# Patient Record
Sex: FEMALE | Race: WHITE | NOT HISPANIC OR LATINO | Employment: OTHER | URBAN - METROPOLITAN AREA
[De-identification: names, ages, dates, MRNs, and addresses within clinical notes are randomized per-mention and may not be internally consistent; named-entity substitution may affect disease eponyms.]

---

## 2017-09-21 ENCOUNTER — APPOINTMENT (EMERGENCY)
Dept: CT IMAGING | Facility: HOSPITAL | Age: 32
End: 2017-09-21
Payer: COMMERCIAL

## 2017-09-21 ENCOUNTER — HOSPITAL ENCOUNTER (OUTPATIENT)
Facility: HOSPITAL | Age: 32
Setting detail: OBSERVATION
Discharge: HOME | End: 2017-09-22
Attending: EMERGENCY MEDICINE | Admitting: GENERAL PRACTICE
Payer: COMMERCIAL

## 2017-09-21 DIAGNOSIS — R20.2 PARESTHESIA OF LEFT ARM: Primary | ICD-10-CM

## 2017-09-21 DIAGNOSIS — R51.9 OCCIPITAL HEADACHE: ICD-10-CM

## 2017-09-21 PROBLEM — R20.0 HAND NUMBNESS: Status: ACTIVE | Noted: 2017-09-21

## 2017-09-21 LAB
ALBUMIN SERPL BCP-MCNC: 3.8 G/DL (ref 3.5–5)
ALP SERPL-CCNC: 63 U/L (ref 46–116)
ALT SERPL W P-5'-P-CCNC: 57 U/L (ref 12–78)
ANION GAP SERPL CALCULATED.3IONS-SCNC: 12 MMOL/L (ref 4–13)
AST SERPL W P-5'-P-CCNC: 26 U/L (ref 5–45)
BASOPHILS # BLD AUTO: 0.03 THOUSANDS/ΜL (ref 0–0.1)
BASOPHILS NFR BLD AUTO: 0 % (ref 0–1)
BILIRUB SERPL-MCNC: 0.5 MG/DL (ref 0.2–1)
BUN SERPL-MCNC: 19 MG/DL (ref 5–25)
CALCIUM SERPL-MCNC: 9.1 MG/DL (ref 8.3–10.1)
CHLORIDE SERPL-SCNC: 101 MMOL/L (ref 100–108)
CLARITY, POC: CLEAR
CO2 SERPL-SCNC: 23 MMOL/L (ref 21–32)
COLOR, POC: NORMAL
CREAT SERPL-MCNC: 0.98 MG/DL (ref 0.6–1.3)
EOSINOPHIL # BLD AUTO: 0.13 THOUSAND/ΜL (ref 0–0.61)
EOSINOPHIL NFR BLD AUTO: 1 % (ref 0–6)
ERYTHROCYTE [DISTWIDTH] IN BLOOD BY AUTOMATED COUNT: 13.7 % (ref 11.6–15.1)
EXT BILIRUBIN, UA: NORMAL
EXT BLOOD URINE: NORMAL
EXT GLUCOSE, UA: NORMAL
EXT KETONES: NORMAL
EXT NITRITE, UA: NORMAL
EXT PH, UA: 5
EXT PREG TEST URINE: NORMAL
EXT PROTEIN, UA: NORMAL
EXT SPECIFIC GRAVITY, UA: 1.01
EXT UROBILINOGEN: 0.2
GFR SERPL CREATININE-BSD FRML MDRD: 76 ML/MIN/1.73SQ M
GLUCOSE SERPL-MCNC: 103 MG/DL (ref 65–140)
HCT VFR BLD AUTO: 40.3 % (ref 34.8–46.1)
HGB BLD-MCNC: 13.6 G/DL (ref 11.5–15.4)
LYMPHOCYTES # BLD AUTO: 3.04 THOUSANDS/ΜL (ref 0.6–4.47)
LYMPHOCYTES NFR BLD AUTO: 28 % (ref 14–44)
MAGNESIUM SERPL-MCNC: 2.2 MG/DL (ref 1.6–2.6)
MCH RBC QN AUTO: 29.9 PG (ref 26.8–34.3)
MCHC RBC AUTO-ENTMCNC: 33.7 G/DL (ref 31.4–37.4)
MCV RBC AUTO: 89 FL (ref 82–98)
MONOCYTES # BLD AUTO: 0.75 THOUSAND/ΜL (ref 0.17–1.22)
MONOCYTES NFR BLD AUTO: 7 % (ref 4–12)
NEUTROPHILS # BLD AUTO: 6.79 THOUSANDS/ΜL (ref 1.85–7.62)
NEUTS SEG NFR BLD AUTO: 63 % (ref 43–75)
NRBC BLD AUTO-RTO: 0 /100 WBCS
PLATELET # BLD AUTO: 359 THOUSANDS/UL (ref 149–390)
PMV BLD AUTO: 9.7 FL (ref 8.9–12.7)
POTASSIUM SERPL-SCNC: 4.1 MMOL/L (ref 3.5–5.3)
PROT SERPL-MCNC: 8.4 G/DL (ref 6.4–8.2)
RBC # BLD AUTO: 4.55 MILLION/UL (ref 3.81–5.12)
SODIUM SERPL-SCNC: 136 MMOL/L (ref 136–145)
TROPONIN I SERPL-MCNC: <0.02 NG/ML
WBC # BLD AUTO: 10.78 THOUSAND/UL (ref 4.31–10.16)
WBC # BLD EST: NORMAL 10*3/UL

## 2017-09-21 PROCEDURE — 70496 CT ANGIOGRAPHY HEAD: CPT

## 2017-09-21 PROCEDURE — 80053 COMPREHEN METABOLIC PANEL: CPT | Performed by: EMERGENCY MEDICINE

## 2017-09-21 PROCEDURE — 93005 ELECTROCARDIOGRAM TRACING: CPT | Performed by: EMERGENCY MEDICINE

## 2017-09-21 PROCEDURE — 70498 CT ANGIOGRAPHY NECK: CPT

## 2017-09-21 PROCEDURE — 96361 HYDRATE IV INFUSION ADD-ON: CPT

## 2017-09-21 PROCEDURE — 84484 ASSAY OF TROPONIN QUANT: CPT | Performed by: EMERGENCY MEDICINE

## 2017-09-21 PROCEDURE — 81025 URINE PREGNANCY TEST: CPT | Performed by: EMERGENCY MEDICINE

## 2017-09-21 PROCEDURE — 36415 COLL VENOUS BLD VENIPUNCTURE: CPT | Performed by: EMERGENCY MEDICINE

## 2017-09-21 PROCEDURE — 96374 THER/PROPH/DIAG INJ IV PUSH: CPT

## 2017-09-21 PROCEDURE — 96375 TX/PRO/DX INJ NEW DRUG ADDON: CPT

## 2017-09-21 PROCEDURE — 85025 COMPLETE CBC W/AUTO DIFF WBC: CPT | Performed by: EMERGENCY MEDICINE

## 2017-09-21 PROCEDURE — 81002 URINALYSIS NONAUTO W/O SCOPE: CPT | Performed by: EMERGENCY MEDICINE

## 2017-09-21 PROCEDURE — 83735 ASSAY OF MAGNESIUM: CPT | Performed by: EMERGENCY MEDICINE

## 2017-09-21 RX ORDER — ASPIRIN 325 MG
325 TABLET ORAL DAILY
Status: DISCONTINUED | OUTPATIENT
Start: 2017-09-22 | End: 2017-09-22 | Stop reason: HOSPADM

## 2017-09-21 RX ORDER — ATORVASTATIN CALCIUM 40 MG/1
40 TABLET, FILM COATED ORAL EVERY EVENING
Status: DISCONTINUED | OUTPATIENT
Start: 2017-09-22 | End: 2017-09-22 | Stop reason: HOSPADM

## 2017-09-21 RX ORDER — METOCLOPRAMIDE HYDROCHLORIDE 5 MG/ML
10 INJECTION INTRAMUSCULAR; INTRAVENOUS ONCE
Status: COMPLETED | OUTPATIENT
Start: 2017-09-21 | End: 2017-09-21

## 2017-09-21 RX ORDER — SODIUM CHLORIDE 9 MG/ML
125 INJECTION, SOLUTION INTRAVENOUS CONTINUOUS
Status: DISCONTINUED | OUTPATIENT
Start: 2017-09-21 | End: 2017-09-22 | Stop reason: HOSPADM

## 2017-09-21 RX ORDER — BUTALBITAL, ACETAMINOPHEN AND CAFFEINE 50; 325; 40 MG/1; MG/1; MG/1
1 TABLET ORAL EVERY 4 HOURS PRN
Status: DISCONTINUED | OUTPATIENT
Start: 2017-09-21 | End: 2017-09-22 | Stop reason: HOSPADM

## 2017-09-21 RX ORDER — DIPHENHYDRAMINE HYDROCHLORIDE 50 MG/ML
25 INJECTION INTRAMUSCULAR; INTRAVENOUS ONCE
Status: COMPLETED | OUTPATIENT
Start: 2017-09-21 | End: 2017-09-21

## 2017-09-21 RX ADMIN — SODIUM CHLORIDE 1000 ML: 0.9 INJECTION, SOLUTION INTRAVENOUS at 19:41

## 2017-09-21 RX ADMIN — DIPHENHYDRAMINE HYDROCHLORIDE 25 MG: 50 INJECTION, SOLUTION INTRAMUSCULAR; INTRAVENOUS at 19:41

## 2017-09-21 RX ADMIN — METOCLOPRAMIDE 10 MG: 5 INJECTION, SOLUTION INTRAMUSCULAR; INTRAVENOUS at 19:38

## 2017-09-21 RX ADMIN — IOHEXOL 85 ML: 350 INJECTION, SOLUTION INTRAVENOUS at 20:20

## 2017-09-22 ENCOUNTER — APPOINTMENT (OUTPATIENT)
Dept: MRI IMAGING | Facility: HOSPITAL | Age: 32
End: 2017-09-22
Payer: COMMERCIAL

## 2017-09-22 VITALS
SYSTOLIC BLOOD PRESSURE: 124 MMHG | WEIGHT: 260 LBS | TEMPERATURE: 98 F | HEIGHT: 68 IN | DIASTOLIC BLOOD PRESSURE: 78 MMHG | RESPIRATION RATE: 10 BRPM | BODY MASS INDEX: 39.4 KG/M2 | OXYGEN SATURATION: 97 % | HEART RATE: 99 BPM

## 2017-09-22 PROBLEM — R51.9 HEADACHE: Status: RESOLVED | Noted: 2017-09-21 | Resolved: 2017-09-22

## 2017-09-22 LAB
ANION GAP SERPL CALCULATED.3IONS-SCNC: 8 MMOL/L (ref 4–13)
ATRIAL RATE: 85 BPM
BUN SERPL-MCNC: 18 MG/DL (ref 5–25)
CALCIUM SERPL-MCNC: 8.9 MG/DL (ref 8.3–10.1)
CHLORIDE SERPL-SCNC: 105 MMOL/L (ref 100–108)
CHOLEST SERPL-MCNC: 214 MG/DL (ref 50–200)
CO2 SERPL-SCNC: 27 MMOL/L (ref 21–32)
CREAT SERPL-MCNC: 1.06 MG/DL (ref 0.6–1.3)
ERYTHROCYTE [DISTWIDTH] IN BLOOD BY AUTOMATED COUNT: 13.6 % (ref 11.6–15.1)
EST. AVERAGE GLUCOSE BLD GHB EST-MCNC: 120 MG/DL
GFR SERPL CREATININE-BSD FRML MDRD: 69 ML/MIN/1.73SQ M
GLUCOSE SERPL-MCNC: 123 MG/DL (ref 65–140)
HBA1C MFR BLD: 5.8 % (ref 4.2–6.3)
HCT VFR BLD AUTO: 38.8 % (ref 34.8–46.1)
HDLC SERPL-MCNC: 51 MG/DL (ref 40–60)
HGB BLD-MCNC: 13.1 G/DL (ref 11.5–15.4)
LDLC SERPL CALC-MCNC: 144 MG/DL (ref 0–100)
MCH RBC QN AUTO: 30 PG (ref 26.8–34.3)
MCHC RBC AUTO-ENTMCNC: 33.8 G/DL (ref 31.4–37.4)
MCV RBC AUTO: 89 FL (ref 82–98)
P AXIS: 29 DEGREES
PLATELET # BLD AUTO: 339 THOUSANDS/UL (ref 149–390)
PMV BLD AUTO: 10.2 FL (ref 8.9–12.7)
POTASSIUM SERPL-SCNC: 3.9 MMOL/L (ref 3.5–5.3)
PR INTERVAL: 146 MS
QRS AXIS: 86 DEGREES
QRSD INTERVAL: 92 MS
QT INTERVAL: 366 MS
QTC INTERVAL: 435 MS
RBC # BLD AUTO: 4.37 MILLION/UL (ref 3.81–5.12)
SODIUM SERPL-SCNC: 140 MMOL/L (ref 136–145)
T WAVE AXIS: 13 DEGREES
TRIGL SERPL-MCNC: 97 MG/DL
VENTRICULAR RATE: 85 BPM
WBC # BLD AUTO: 10.54 THOUSAND/UL (ref 4.31–10.16)

## 2017-09-22 PROCEDURE — 83036 HEMOGLOBIN GLYCOSYLATED A1C: CPT | Performed by: PHYSICIAN ASSISTANT

## 2017-09-22 PROCEDURE — 36415 COLL VENOUS BLD VENIPUNCTURE: CPT | Performed by: PHYSICIAN ASSISTANT

## 2017-09-22 PROCEDURE — 80061 LIPID PANEL: CPT | Performed by: PHYSICIAN ASSISTANT

## 2017-09-22 PROCEDURE — 85027 COMPLETE CBC AUTOMATED: CPT | Performed by: PHYSICIAN ASSISTANT

## 2017-09-22 PROCEDURE — 80048 BASIC METABOLIC PNL TOTAL CA: CPT | Performed by: PHYSICIAN ASSISTANT

## 2017-09-22 PROCEDURE — 99285 EMERGENCY DEPT VISIT HI MDM: CPT

## 2017-09-22 PROCEDURE — 70551 MRI BRAIN STEM W/O DYE: CPT

## 2017-09-22 RX ORDER — TOPIRAMATE 25 MG/1
25 TABLET ORAL 2 TIMES DAILY
Qty: 60 TABLET | Refills: 0 | Status: SHIPPED | OUTPATIENT
Start: 2017-09-22 | End: 2017-09-22

## 2017-09-22 RX ORDER — BUTALBITAL, ACETAMINOPHEN AND CAFFEINE 50; 325; 40 MG/1; MG/1; MG/1
1 TABLET ORAL EVERY 4 HOURS PRN
Qty: 10 TABLET | Refills: 0 | Status: SHIPPED | OUTPATIENT
Start: 2017-09-22 | End: 2018-06-26

## 2017-09-22 RX ORDER — TOPIRAMATE 25 MG/1
25 TABLET ORAL
Qty: 30 TABLET | Refills: 0 | Status: SHIPPED | OUTPATIENT
Start: 2017-09-22 | End: 2018-06-26

## 2017-09-22 RX ORDER — TOPIRAMATE 25 MG/1
25 TABLET ORAL 2 TIMES DAILY
Status: DISCONTINUED | OUTPATIENT
Start: 2017-09-22 | End: 2017-09-22 | Stop reason: HOSPADM

## 2017-09-22 RX ORDER — BUTALBITAL, ACETAMINOPHEN AND CAFFEINE 50; 325; 40 MG/1; MG/1; MG/1
1 TABLET ORAL EVERY 4 HOURS PRN
Qty: 10 TABLET | Refills: 0 | Status: SHIPPED | OUTPATIENT
Start: 2017-09-22 | End: 2017-09-22

## 2017-09-22 RX ADMIN — SODIUM CHLORIDE 125 ML/HR: 0.9 INJECTION, SOLUTION INTRAVENOUS at 07:29

## 2017-09-22 RX ADMIN — BUTALBITAL, ACETAMINOPHEN, AND CAFFEINE 1 TABLET: 50; 325; 40 TABLET ORAL at 08:19

## 2017-09-22 RX ADMIN — TOPIRAMATE 25 MG: 25 TABLET, FILM COATED ORAL at 12:42

## 2017-09-22 RX ADMIN — ASPIRIN 325 MG: 325 TABLET ORAL at 08:22

## 2017-09-29 ENCOUNTER — ALLSCRIPTS OFFICE VISIT (OUTPATIENT)
Dept: OTHER | Facility: OTHER | Age: 32
End: 2017-09-29

## 2017-09-29 ENCOUNTER — TRANSCRIBE ORDERS (OUTPATIENT)
Dept: ADMINISTRATIVE | Facility: HOSPITAL | Age: 32
End: 2017-09-29

## 2017-09-29 DIAGNOSIS — M76.10: Primary | ICD-10-CM

## 2017-09-29 DIAGNOSIS — M96.1 POSTLAMINECTOMY SYNDROME, NOT ELSEWHERE CLASSIFIED: ICD-10-CM

## 2017-10-13 ENCOUNTER — HOSPITAL ENCOUNTER (OUTPATIENT)
Dept: MRI IMAGING | Facility: CLINIC | Age: 32
Discharge: HOME | End: 2017-10-13
Payer: COMMERCIAL

## 2017-10-13 DIAGNOSIS — M96.1 POSTLAMINECTOMY SYNDROME, NOT ELSEWHERE CLASSIFIED: ICD-10-CM

## 2017-10-13 PROCEDURE — 72156 MRI NECK SPINE W/O & W/DYE: CPT

## 2017-10-13 PROCEDURE — A9585 GADOBUTROL INJECTION: HCPCS | Performed by: PSYCHIATRY & NEUROLOGY

## 2017-10-13 RX ADMIN — GADOBUTROL 13 ML: 604.72 INJECTION INTRAVENOUS at 09:58

## 2017-10-27 NOTE — PROGRESS NOTES
Assessment  1  Failed neck syndrome (722 81) (M96 1)   2  Cervical radiculopathy at C5 (723 4) (M54 12)   3  Headache (784 0) (R51)    Plan  Cervical radiculopathy at C5, Failed neck syndrome    · EMG, performed same day as NCV, 5 or less muscles - POC; Status:Need Information -  Financial Authorization; Requested [id number];    Perform: In Office; Due:19Ynr6547; Ordered; For:Cervical radiculopathy at C5, Failed neck syndrome; Ordered By:Adarsh Herring;  Cervical radiculopathy at C5, Failed neck syndrome, Headache    · TiZANidine HCl - 4 MG Oral Tablet; TAKE 1 TABLET AT BEDTIME AS NEEDED   Rx By: Bishop Turner; Dispense: 30 Days ; #:30 Tablet; Refill: 2;For: Cervical radiculopathy at C5, Failed neck syndrome, Headache; ORLIN = N; Verified Transmission to [facility]/PHARMACY #[id number]; Last Updated By: System, [place] [employer]; 2017 4:13:08 PM  Failed neck syndrome    · * MRI CERVICAL SPINE W WO CONTRAST; Status:Need Information - Financial  Authorization; Requested GCL:[id number];    Perform:[facility]; LRN:[id number]; Ordered; For:Failed neck syndrome; Ordered By:Adarsh Herring;   · *1 - [facility]  *  Status: Active  Requested for: 55SLX3261   Ordered; For: Failed neck syndrome; Ordered By: Bishop Turner Performed:  Due: 82MQT4204  Care Summary provided  : Yes   · Follow-up visit in 2 months Evaluation and Treatment  Follow-up  Status: Hold For -  Scheduling  Requested for: 34PUA0679   Ordered; For: Failed neck syndrome; Ordered By: Bishop Turner Performed:  Due: 84MRJ7074    Discussion/Summary  Discussion Summary:   Patient with a history of cervical fusion, has been experiencing symptoms and signs of a left C5 radiculopathy associated with a dull headache most likely cervicogenic in origin, and is now advised to go for physical therapy with modalities and strengthening exercises to the cervical region, Zanaflex 4 mg at bedtime was prescribed and is also advised an EMG study of the left habits, no melena, no loss of bowel control  Genitourinary:  no incontinence, no feelings of urinary urgency, no increase in frequency, no urinary hesitancy, no dysuria, no hematuria  Musculoskeletal:  no arthralgias, no myalgias, no immobility or loss of function, no head/neck/back pain, no pain while walking  Integumentary  no masses, no rash, no skin lesions, no livedo reticularis  Psychiatric: anxiety  Endocrine  no unusual weight loss or gain, no excessive urination, no excessive thirst, no hair loss or gain, no hot or cold intolerance, no menstrual period change or irregularity, no loss of sexual ability or drive, no erection difficulty, no nipple discharge  Hematologic/Lymphatic:  no unusual bleeding, no tendency for easy bruising, no clotting skin or lumps  Neurological General: headache,-- lightheadedness,-- increased sleepiness-- and-- waking up at night  Neurological Mental Status:  no confusion, no mood swings, no alteration or loss of consciousness, no difficulty expressing/understanding speech, no memory problems  Neurological Cranial Nerves:  no blurry or double vision, no loss of vision, no face drooping, no facial numbness or weakness, no taste or smell loss/changes, no hearing loss or ringing, no vertigo or dizziness, no dysphagia, no slurred speech  Neurological Motor findings include:  no tremor, no twitching, no cramping(pre/post exercise), no atrophy  Neurological Coordination:  no unsteadiness, no vertigo or dizziness, no clumsiness, no problems reaching for objects  Neurological Sensory: numbness-- and-- tingling  Neurological Gait:  no difficulty walking, not falling to one side, no sensation of being pushed, has not had falls  ROS Reviewed:   ROS reviewed  Past Medical History  1  History of cerebral hemorrhage (V12 59) (Z31 77)  Active Problems And Past Medical History Reviewed: The active problems and past medical history were reviewed and updated today  Surgical History  1  History of Ankle Surgery   2  History of Cervical Vertebral Fusion   3  History of Corneal LASIK Bilateral   4  History of Left Breast Lumpectomy   5  History of Tonsillectomy With Adenoidectomy    Family History  Mother    1  Family history of arthritis (V17 7) (Z82 61)   2  Family history of hypertension (V17 49) (Z82 49)   3  Family history of type 2 diabetes mellitus (V18 0) (Z83 3)  Father    4  Family history of     Social History   · Former smoker (T65 01) (M63 056)   · No illicit drug use   · Occasional alcohol use   · Self-employed   · Single  Social History Reviewed: The social history was reviewed and updated today  Current Meds   1  No Reported Medications Recorded  Medication List Reviewed: The medication list was reviewed and updated today  Allergies  1  No Known Drug Allergies    Vitals  Signs   Recorded: 51KXP2792 03:35PM   Heart Rate: 88  Systolic: 627, LUE, Sitting  Diastolic: 86, LUE, Sitting  Height: 5 ft 7 in  Weight: 281 lb   BMI Calculated: 44 01  BSA Calculated: 2 34  Pain Scale: 6    Physical Exam    Constitutional   General appearance: No acute distress, well appearing and well nourished  Musculoskeletal   Gait and station: Normal gait, stance and balance  Muscle strength: Normal strength throughout  Muscle tone: No atrophy, abnormal movements, flaccidity, cogwheeling or spasticity  Neurologic   Orientation to person, place, and time: Normal     Language: Names objects, able to repeat phrases and speaks spontaneously  2nd cranial nerve: Normal     3rd, 4th, and 6th cranial nerves: Normal     5th cranial nerve: Normal     7th cranial nerve: Normal     8th cranial nerve: Normal     9th cranial nerve: Normal     10th cranial nerve: Normal     11th cranial nerve: Normal     12th cranial nerve: Normal     Sensation: Normal     Reflexes: Abnormal  -- Deep tendon reflexes are hypoactive throughout     Coordination: Normal   Patient has evidence of mild to moderate cervical paraspinal tenderness on the left side  Future Appointments    Date/Time Provider Specialty Site   02/15/2018 09:40 AM Geetha Matos MD Neurology [facility]     Signatures   Electronically signed by :  Anni Jha MD; Sep 29 2017  4:21PM EST                       (Author)

## 2017-11-14 ENCOUNTER — ALLSCRIPTS OFFICE VISIT (OUTPATIENT)
Dept: OTHER | Facility: OTHER | Age: 32
End: 2017-11-14

## 2017-11-16 ENCOUNTER — ALLSCRIPTS OFFICE VISIT (OUTPATIENT)
Dept: OTHER | Facility: OTHER | Age: 32
End: 2017-11-16

## 2017-11-16 DIAGNOSIS — M54.12 RADICULOPATHY OF CERVICAL REGION: ICD-10-CM

## 2017-11-16 DIAGNOSIS — M96.1 POSTLAMINECTOMY SYNDROME, NOT ELSEWHERE CLASSIFIED: ICD-10-CM

## 2017-11-17 NOTE — PROGRESS NOTES
Assessment  1  Failed neck syndrome (722 81) (M96 1)   2  Carpal tunnel syndrome of left wrist (354 0) (G56 02)   3  HNP (herniated nucleus pulposus), cervical (722 0) (M50 20)    Plan   Carpal tunnel syndrome of left wrist    · Continue with our present treatment plan ; Status:Complete;   Done: 09BRM7921   Ordered;tunnel syndrome of left wrist; Ordered By:Adarsh Herring;  Cervical radiculopathy at C5, Failed neck syndrome    · Physical Therapy Referral Other Co-Management  *  Status: Active  Requested for:52Pku3046   Ordered; For: Cervical radiculopathy at C5, Failed neck syndrome; Ordered By: Jaguar Yee Performed:  Due: 50PTP2779  are Referring to a non- Preferred Provider : Established Patient  Care Summary provided  : Yes  Cervical radiculopathy at C5, Failed neck syndrome, Headache    · TiZANidine HCl - 4 MG Oral Tablet; TAKE 1 TABLET AT BEDTIME AS NEEDED   Rx By: Jaguar Yee; Dispense: 30 Days ; #:30 Tablet; Refill: 2;For: Cervical radiculopathy at C5, Failed neck syndrome, Headache; ORLIN = N; Verified Transmission to [facility]/PHARMACY #[id number]; Last Updated By: System, [employer]; 11/16/2017 2:29:07 PM  Failed neck syndrome    · Meloxicam 15 MG Oral Tablet; TAKE 1 TABLET DAILY   Rx By: Jaguar Yee; Dispense: 30 Days ; #:30 Tablet; Refill: 2;Failed neck syndrome; ORLIN = N; Verified Transmission to [facility]/PHARM[facility] #[id number]; Last Updated By: System, [employer]; 11/16/2017 2:29:08 PM  Follow-up visit in 3 months Evaluation and Treatment  Follow-up  Status: Hold For - Scheduling  Requested for: 98DBA2730 Ordered; For: Carpal tunnel syndrome of left wrist;  Ordered By: Jaguar Yee  Performed:   Due: 51QLO1548   Discussion/Summary  Discussion Summary:   Patient with a history of failed neck syndrome, has evidence of a new disc herniation at C6-C7 and symptoms of a left C7 radiculopathy   She also has a left carpal tunnel syndrome, and at this time she is encouraged to go back to physical therapy, to the cervical region and also advised to start meloxicam 15 mg daily and gabapentin 100 mg p  o  b i melissa De La Torre She will return back to see me in 2 months  Chief Complaint  Chief Complaint Free Text Note Form: Patient present for follow up appointment for her failed neck syndrome, cervical radiculopathy, and headache  History of Present Illness  HPI: Patient is here for a follow-up visit with a history of failed neck syndrome, worsening cervical strain in the recent past with pain radiating down the left upper extremity  Since her last visit she was started on Zanaflex 4 mg at bedtime which does help her sleep and relieves the pain, and also had an MRI of the cervical spine which shows stable ACDF at C5-C6 and a central small disc herniation at C6-C7  EMG studies of the left upper extremity showed evidence of moderate median neuropathy but no evidence of any cervical radiculopathy  Patient continues to have symptoms radiating down the left upper extremity associated with neck pain  The test results were discussed with her at length unfortunately she could not go for physical therapy due to her work schedule  Review of Systems  Neurological ROS:  Constitutional: no fever, no chills, no recent weight gain, no recent weight loss, no complaints of feeling tired, no changes in appetite  HEENT:  no sinus problems, not feeling congested, no blurred vision, no dryness of the eyes, no eye pain, no hearing loss, no tinnitus, no mouth sores, no sore throat, no hoarseness, no dysphagia, no masses, no bleeding  Cardiovascular:  no chest pain or pressure, no palpitations present, the heart rate was not rapid or irregular, no swelling in the arms or legs, no poor circulation  Respiratory:  no unusual or persistant cough, no shortness of breath with or without exertion  Gastrointestinal:  no nausea, no vomiting, no diarrhea, no abdominal pain, no changes in bowel habits, no melena, no loss of bowel control  Genitourinary:  no incontinence, no feelings of urinary urgency, no increase in frequency, no urinary hesitancy, no dysuria, no hematuria  Musculoskeletal: arthralgias-- and-- head/neck/back pain  Integumentary  no masses, no rash, no skin lesions, no livedo reticularis  Psychiatric:  no anxiety, no depression, no mood swings, no psychiatric hospitalizations, no sleep problems  Endocrine  no unusual weight loss or gain, no excessive urination, no excessive thirst, no hair loss or gain, no hot or cold intolerance, no menstrual period change or irregularity, no loss of sexual ability or drive, no erection difficulty, no nipple discharge  Neurological General: headache  Neurological Mental Status:  no confusion, no mood swings, no alteration or loss of consciousness, no difficulty expressing/understanding speech, no memory problems  Neurological Cranial Nerves:  no blurry or double vision, no loss of vision, no face drooping, no facial numbness or weakness, no taste or smell loss/changes, no hearing loss or ringing, no vertigo or dizziness, no dysphagia, no slurred speech  Neurological Motor findings include:  no tremor, no twitching, no cramping(pre/post exercise), no atrophy  Neurological Coordination: balance difficulties-- and-- clumsiness  Neurological Sensory: numbness-- and-- tingling  Neurological Gait:  no difficulty walking, not falling to one side, no sensation of being pushed, has not had falls  ROS Reviewed:   ROS reviewed  Active Problems  1  Cervical radiculopathy at C5 (723 4) (M54 12)   2  Failed neck syndrome (722 81) (M96 1)   3  Headache (784 0) (R51)   4  Hypertension (401 9) (I10)   5  Numbness and tingling in left arm (782 0) (R20 0,R20 2)    Past Medical History  1  History of cerebral hemorrhage (V12 59) (Z86 79)    Surgical History  1  History of Ankle Surgery   2  History of Cervical Vertebral Fusion   3  History of Corneal LASIK Bilateral   4   History of Left Breast Lumpectomy   5  History of Tonsillectomy With Adenoidectomy    Family History  Mother    1  Family history of arthritis (V17 7) (Z82 61)   2  Family history of hypertension (V17 49) (Z82 49)   3  Family history of type 2 diabetes mellitus (V18 0) (Z83 3)  Father    4  Family history of     Social History     · Former smoker (U58 96) (Z79 588)   · No illicit drug use   · Occasional alcohol use   · Self-employed   · Single  Social History Reviewed: The social history was reviewed and updated today  Current Meds   1  TiZANidine HCl - 4 MG Oral Tablet; TAKE 1 TABLET AT BEDTIME AS NEEDED; Therapy: 32BCJ0802 to (Evaluate:98Ygu3831)  Requested for: 36VXE0944; Last Rx:90Suz5128 Ordered  Medication List Reviewed: The medication list was reviewed and updated today  Allergies  1  No Known Drug Allergies    2  No Known Environmental Allergies   3  No Known Food Allergies    Vitals  Signs   Recorded: 69HZC6821 01:38PM   Heart Rate: 96  Systolic: 729  Diastolic: 86  Height: 5 ft 7 5 in  Weight: 283 lb   BMI Calculated: 43 67  BSA Calculated: 2 36    Physical Exam   Constitutional  General appearance: No acute distress, well appearing and well nourished  Musculoskeletal  Gait and station: Normal gait, stance and balance  Muscle strength: Normal strength throughout  Muscle tone: No atrophy, abnormal movements, flaccidity, cogwheeling or spasticity  Neurologic  Orientation to person, place, and time: Normal    Language: Names objects, able to repeat phrases and speaks spontaneously  3rd, 4th, and 6th cranial nerves: Normal    5th cranial nerve: Normal    7th cranial nerve: Normal    Sensation: Normal    Reflexes: Normal    Coordination: Normal   Patient has significant lower cervical paraspinal tenderness with tenderness noted along the left trapezius        Future Appointments    Date/Time Provider Specialty Site   2018 01:40 PM Iris Dutta MD [facility]rology [facility] [address] [place] [address] Signatures   Electronically signed by :  Nohemi Perez MD; Nov 16 2017  3:16PM EST                       (Author)

## 2018-01-10 NOTE — PROCEDURES
Results/Data    Procedure: Electromyogram and Nerve Conduction Study  Indication: Left Upper Extremity   Referred by Dr Risa Shell  The procedure's were discussed with the patient  Written consent was obtained prior to the procedure and is detailed in the patient's record  Prior to the start of the procedure a time out was taken and the identity of the patient was confirmed via name and date of birth with the patient  The correct site and the procedure to be performed were confirmed  The correct side was confirmed if applicable  The positioning of the patient was verified  The availability of the correct equipment was verified  Procedure Start Time: 9:30    Technique: A sterile concentric needle electrode was used  The patient tolerated the procedure well  There were no complications  Results :Motor and sensory nerve conduction studies were performed on the median and ulnar nerves  The median motor terminal latency was within normal limits with a low compound motor action potential amplitude and a normal conduction velocity across the wrist   The ulnar compound motor action potential was within normal limits  The median and ulnar F-waves were normal   The median  and ulnar sensory action potentials were normal   The median   palmar evoked response was prolonged by 0 6 milliseconds as compared to the left ulnar palmar evoked  response at the same distance  Concentric needle examination was performed at various proximal and distal muscles including deltoid, biceps, triceps, pronator teres, apb, FDI and low cervical paraspinals  There was no evidence of active denervation in any of the muscles tested  Mild decreased recruitment was noted in the abductor pollicis brevis  The compound motor unit action potentials were of normal configuration with   interference patterns being full of full for further in the remaining muscles tested      Interpretation :  There is electrophysiologic evidence of a:    1  Moderate median nerve compression neuropathy at the wrist with predominant axonal changes, consistent with a diagnosis of carpal tunnel syndrome  2  There is no evidence of a cervical radiculopathy or ulnar neuropathy  Clinical correlation is recommended        Signatures   Electronically signed by : Danyel Diop MD; Nov 14 2017 11:32AM EST                       (Author)

## 2018-01-13 VITALS
WEIGHT: 281 LBS | BODY MASS INDEX: 44.1 KG/M2 | DIASTOLIC BLOOD PRESSURE: 86 MMHG | SYSTOLIC BLOOD PRESSURE: 124 MMHG | HEIGHT: 67 IN | HEART RATE: 88 BPM

## 2018-01-14 VITALS
WEIGHT: 283 LBS | DIASTOLIC BLOOD PRESSURE: 86 MMHG | HEART RATE: 96 BPM | HEIGHT: 68 IN | BODY MASS INDEX: 42.89 KG/M2 | SYSTOLIC BLOOD PRESSURE: 132 MMHG

## 2018-02-20 DIAGNOSIS — S16.1XXS CERVICAL STRAIN, SEQUELA: Primary | ICD-10-CM

## 2018-02-20 RX ORDER — MELOXICAM 15 MG/1
TABLET ORAL
Qty: 30 TABLET | Refills: 2 | Status: SHIPPED | OUTPATIENT
Start: 2018-02-20 | End: 2018-06-26

## 2018-06-26 ENCOUNTER — OFFICE VISIT (OUTPATIENT)
Dept: NEUROLOGY | Facility: CLINIC | Age: 33
End: 2018-06-26
Payer: COMMERCIAL

## 2018-06-26 VITALS
HEIGHT: 68 IN | DIASTOLIC BLOOD PRESSURE: 68 MMHG | SYSTOLIC BLOOD PRESSURE: 114 MMHG | HEART RATE: 77 BPM | WEIGHT: 255 LBS | BODY MASS INDEX: 38.65 KG/M2

## 2018-06-26 DIAGNOSIS — G45.8 OTHER SPECIFIED TRANSIENT CEREBRAL ISCHEMIAS: Primary | ICD-10-CM

## 2018-06-26 PROCEDURE — 99214 OFFICE O/P EST MOD 30 MIN: CPT | Performed by: PSYCHIATRY & NEUROLOGY

## 2018-06-26 RX ORDER — OLMESARTAN MEDOXOMIL 20 MG/1
20 TABLET ORAL DAILY PRN
COMMUNITY
End: 2020-10-22

## 2018-06-26 NOTE — PROGRESS NOTES
Progress Note - Neurology   Talia Hinojosa 27 y o  female MRN: [id number]  Unit/Bed#:  Encounter: [id number]      Subjective:   Patient is here on an emergency basis for the last 4 days has been experiencing episodes of dizziness, associated with the fogginess in the head, feeling confused and disoriented, and these episodes are brief but continued to bother her  She also claims she checked her blood pressure and her blood pressure has been high so she decided to take her brother's blood pressure medications  She also complains of a dull headache in the bifrontal head region not associated with any vascular features and denies any motor or sensory symptoms in the upper lower extremities or any speech difficulty  No history of seizure disorder in the past   Patient had an episode in September of last year during which time she was hospitalized and had a workup done felt to have complicated migraine and subsequently in November also had neck pain and left shoulder pain which has resolved with the help of physical therapy  Patient is on no medications at this time except for the blood pressure medications that she recently borrowed from her brother  Since she has been taking the blood pressure medications she has felt somewhat better  Patient claims she was under severe stressful situations last week and was overwhelmed  ROS:   Review of Systems   Constitutional: Negative  HENT: Negative  Eyes: Negative  Respiratory: Negative  Cardiovascular: Negative  Gastrointestinal: Negative  Endocrine: Positive for heat intolerance  Genitourinary: Negative  Musculoskeletal: Positive for joint swelling and neck pain  Skin: Negative  Allergic/Immunologic: Negative  Neurological: Positive for dizziness, light-headedness and headaches  Hematological: Negative  Psychiatric/Behavioral: Positive for agitation, behavioral problems and confusion  Vitals:  There were no vitals filed for this visit  ,Body mass index is 39 35 kg/m²  MEDS:      Current Outpatient Prescriptions:     butalbital-acetaminophen-caffeine (FIORICET,ESGIC) -40 mg per tablet, Take 1 tablet by mouth every 4 (four) hours as needed for headaches, Disp: 10 tablet, Rfl: 0    meloxicam (MOBIC) 15 mg tablet, TAKE 1 TABLET DAILY  , Disp: 30 tablet, Rfl: 2    topiramate (TOPAMAX) 25 mg tablet, Take 1 tablet by mouth daily at bedtime, Disp: 30 tablet, Rfl: 0  :    Physical Exam:  General appearance: alert, appears stated age and cooperative  Head: Normocephalic, without obvious abnormality, atraumatic    Neurologic:  Patient is alert awake oriented, high functions are intact, speech is fluent  No evidence of any aphasia or dysarthria  Cranial nerve examination reveals visual fields are full to threat, pupils equal and reactive, extraocular movements intact, fundi showed sharp disc margins, sensation in the V1 V2 V3 distribution is symmetric, no obvious facial asymmetry noted,Hearing is preserved, tongue is midline and gag is adequate, shoulder shrug is symmetric bilaterally  Motor examination reveals normal tone and bulk, no evidence of any drift to the outstretched extremities, strength is 5/5 preserved bilaterally in both upper and lower extremities, deep tendon reflexes are intact, toes are downgoing  Sensory examination to pinprick light touch proprioception and vibration is preserved bilaterally, patient does not extinguish double simultaneous stimuli  Coordination no evidence of any finger-to-nose dysmetria  Gait is normal based Romberg sign is negative  Lab Results: I have personally reviewed pertinent reports  Imaging Studies: I have personally reviewed pertinent reports  Assessment:  1  TIA versus anxiety versus complex partial seizures  2  Recently detected hypertension      Plan:  Patient is advised to see her primary physician at the earliest and she agrees to do the same, in the meanwhile is advised an MRI of the brain and an EEG  She will return back to see me in 3 weeks  6/26/2018,2:06 PM    Dictation voice to text software has been used in the creation of this document  Please consider this in light of any contextual or grammatical errors

## 2018-07-10 ENCOUNTER — HOSPITAL ENCOUNTER (OUTPATIENT)
Dept: MRI IMAGING | Facility: HOSPITAL | Age: 33
Discharge: HOME | End: 2018-07-10
Attending: PSYCHIATRY & NEUROLOGY
Payer: COMMERCIAL

## 2018-07-10 DIAGNOSIS — G45.8 OTHER SPECIFIED TRANSIENT CEREBRAL ISCHEMIAS: ICD-10-CM

## 2018-07-10 PROCEDURE — 70551 MRI BRAIN STEM W/O DYE: CPT

## 2018-07-19 ENCOUNTER — HOSPITAL ENCOUNTER (OUTPATIENT)
Dept: NEUROLOGY | Facility: HOSPITAL | Age: 33
Discharge: HOME | End: 2018-07-19
Attending: PSYCHIATRY & NEUROLOGY
Payer: COMMERCIAL

## 2018-07-19 DIAGNOSIS — G45.8 OTHER SPECIFIED TRANSIENT CEREBRAL ISCHEMIAS: ICD-10-CM

## 2018-07-19 PROCEDURE — 95816 EEG AWAKE AND DROWSY: CPT | Performed by: PSYCHIATRY & NEUROLOGY

## 2018-07-19 PROCEDURE — 95816 EEG AWAKE AND DROWSY: CPT

## 2019-08-06 ENCOUNTER — TRANSCRIBE ORDERS (OUTPATIENT)
Dept: NEUROLOGY | Facility: CLINIC | Age: 34
End: 2019-08-06

## 2019-08-06 DIAGNOSIS — M54.2 CERVICALGIA: Primary | ICD-10-CM

## 2020-02-03 ENCOUNTER — APPOINTMENT (EMERGENCY)
Dept: RADIOLOGY | Facility: HOSPITAL | Age: 35
End: 2020-02-03
Payer: COMMERCIAL

## 2020-02-03 ENCOUNTER — HOSPITAL ENCOUNTER (EMERGENCY)
Facility: HOSPITAL | Age: 35
Discharge: HOME | End: 2020-02-03
Attending: EMERGENCY MEDICINE | Admitting: EMERGENCY MEDICINE
Payer: COMMERCIAL

## 2020-02-03 VITALS
HEART RATE: 86 BPM | RESPIRATION RATE: 18 BRPM | TEMPERATURE: 98.3 F | DIASTOLIC BLOOD PRESSURE: 82 MMHG | BODY MASS INDEX: 43.92 KG/M2 | WEIGHT: 284.61 LBS | OXYGEN SATURATION: 94 % | SYSTOLIC BLOOD PRESSURE: 142 MMHG

## 2020-02-03 DIAGNOSIS — J11.1 INFLUENZA-LIKE ILLNESS: Primary | ICD-10-CM

## 2020-02-03 DIAGNOSIS — B34.9 VIRAL SYNDROME: ICD-10-CM

## 2020-02-03 LAB
ALBUMIN SERPL BCP-MCNC: 3.4 G/DL (ref 3.5–5)
ALP SERPL-CCNC: 51 U/L (ref 46–116)
ALT SERPL W P-5'-P-CCNC: 39 U/L (ref 12–78)
ANION GAP SERPL CALCULATED.3IONS-SCNC: 12 MMOL/L (ref 4–13)
AST SERPL W P-5'-P-CCNC: 16 U/L (ref 5–45)
BASOPHILS # BLD AUTO: 0.05 THOUSANDS/ΜL (ref 0–0.1)
BASOPHILS NFR BLD AUTO: 0 % (ref 0–1)
BILIRUB SERPL-MCNC: 1.1 MG/DL (ref 0.2–1)
BILIRUB UR QL STRIP: NEGATIVE
BUN SERPL-MCNC: 12 MG/DL (ref 5–25)
CALCIUM SERPL-MCNC: 8.7 MG/DL (ref 8.3–10.1)
CHLORIDE SERPL-SCNC: 101 MMOL/L (ref 100–108)
CLARITY UR: CLEAR
CO2 SERPL-SCNC: 24 MMOL/L (ref 21–32)
COLOR UR: YELLOW
CREAT SERPL-MCNC: 0.8 MG/DL (ref 0.6–1.3)
D DIMER PPP FEU-MCNC: <0.27 UG/ML FEU
EOSINOPHIL # BLD AUTO: 0.09 THOUSAND/ΜL (ref 0–0.61)
EOSINOPHIL NFR BLD AUTO: 0 % (ref 0–6)
ERYTHROCYTE [DISTWIDTH] IN BLOOD BY AUTOMATED COUNT: 14 % (ref 11.6–15.1)
EXT PREG TEST URINE: NEGATIVE
EXT. CONTROL ED NAV: NORMAL
FLUAV RNA NPH QL NAA+PROBE: NORMAL
FLUBV RNA NPH QL NAA+PROBE: NORMAL
GFR SERPL CREATININE-BSD FRML MDRD: 95 ML/MIN/1.73SQ M
GLUCOSE SERPL-MCNC: 133 MG/DL (ref 65–140)
GLUCOSE UR STRIP-MCNC: NEGATIVE MG/DL
HCT VFR BLD AUTO: 40.2 % (ref 34.8–46.1)
HGB BLD-MCNC: 13.4 G/DL (ref 11.5–15.4)
HGB UR QL STRIP.AUTO: NEGATIVE
IMM GRANULOCYTES # BLD AUTO: 0.1 THOUSAND/UL (ref 0–0.2)
IMM GRANULOCYTES NFR BLD AUTO: 0 % (ref 0–2)
KETONES UR STRIP-MCNC: NEGATIVE MG/DL
LEUKOCYTE ESTERASE UR QL STRIP: NEGATIVE
LYMPHOCYTES # BLD AUTO: 2.37 THOUSANDS/ΜL (ref 0.6–4.47)
LYMPHOCYTES NFR BLD AUTO: 10 % (ref 14–44)
MCH RBC QN AUTO: 29.9 PG (ref 26.8–34.3)
MCHC RBC AUTO-ENTMCNC: 33.3 G/DL (ref 31.4–37.4)
MCV RBC AUTO: 90 FL (ref 82–98)
MONOCYTES # BLD AUTO: 0.95 THOUSAND/ΜL (ref 0.17–1.22)
MONOCYTES NFR BLD AUTO: 4 % (ref 4–12)
NEUTROPHILS # BLD AUTO: 20.2 THOUSANDS/ΜL (ref 1.85–7.62)
NEUTS SEG NFR BLD AUTO: 86 % (ref 43–75)
NITRITE UR QL STRIP: NEGATIVE
NRBC BLD AUTO-RTO: 0 /100 WBCS
PH UR STRIP.AUTO: 5.5 [PH]
PLATELET # BLD AUTO: 369 THOUSANDS/UL (ref 149–390)
PMV BLD AUTO: 10.3 FL (ref 8.9–12.7)
POTASSIUM SERPL-SCNC: 3.7 MMOL/L (ref 3.5–5.3)
PROT SERPL-MCNC: 7.9 G/DL (ref 6.4–8.2)
PROT UR STRIP-MCNC: NEGATIVE MG/DL
RBC # BLD AUTO: 4.48 MILLION/UL (ref 3.81–5.12)
RSV RNA NPH QL NAA+PROBE: NORMAL
SODIUM SERPL-SCNC: 137 MMOL/L (ref 136–145)
SP GR UR STRIP.AUTO: 1.02 (ref 1–1.03)
TROPONIN I SERPL-MCNC: <0.02 NG/ML
UROBILINOGEN UR QL STRIP.AUTO: 0.2 E.U./DL
WBC # BLD AUTO: 23.76 THOUSAND/UL (ref 4.31–10.16)

## 2020-02-03 PROCEDURE — 99284 EMERGENCY DEPT VISIT MOD MDM: CPT

## 2020-02-03 PROCEDURE — 81003 URINALYSIS AUTO W/O SCOPE: CPT | Performed by: EMERGENCY MEDICINE

## 2020-02-03 PROCEDURE — 93005 ELECTROCARDIOGRAM TRACING: CPT

## 2020-02-03 PROCEDURE — 71046 X-RAY EXAM CHEST 2 VIEWS: CPT

## 2020-02-03 PROCEDURE — 80053 COMPREHEN METABOLIC PANEL: CPT | Performed by: EMERGENCY MEDICINE

## 2020-02-03 PROCEDURE — 84484 ASSAY OF TROPONIN QUANT: CPT | Performed by: EMERGENCY MEDICINE

## 2020-02-03 PROCEDURE — 96374 THER/PROPH/DIAG INJ IV PUSH: CPT

## 2020-02-03 PROCEDURE — 85379 FIBRIN DEGRADATION QUANT: CPT | Performed by: EMERGENCY MEDICINE

## 2020-02-03 PROCEDURE — 99285 EMERGENCY DEPT VISIT HI MDM: CPT | Performed by: EMERGENCY MEDICINE

## 2020-02-03 PROCEDURE — 87631 RESP VIRUS 3-5 TARGETS: CPT | Performed by: EMERGENCY MEDICINE

## 2020-02-03 PROCEDURE — 36415 COLL VENOUS BLD VENIPUNCTURE: CPT | Performed by: EMERGENCY MEDICINE

## 2020-02-03 PROCEDURE — 85025 COMPLETE CBC W/AUTO DIFF WBC: CPT | Performed by: EMERGENCY MEDICINE

## 2020-02-03 PROCEDURE — 81025 URINE PREGNANCY TEST: CPT | Performed by: EMERGENCY MEDICINE

## 2020-02-03 RX ORDER — KETOROLAC TROMETHAMINE 30 MG/ML
15 INJECTION, SOLUTION INTRAMUSCULAR; INTRAVENOUS ONCE
Status: COMPLETED | OUTPATIENT
Start: 2020-02-03 | End: 2020-02-03

## 2020-02-03 RX ORDER — SODIUM CHLORIDE 9 MG/ML
3 INJECTION INTRAVENOUS AS NEEDED
Status: DISCONTINUED | OUTPATIENT
Start: 2020-02-03 | End: 2020-02-03 | Stop reason: HOSPADM

## 2020-02-03 RX ADMIN — KETOROLAC TROMETHAMINE 15 MG: 30 INJECTION, SOLUTION INTRAMUSCULAR at 18:28

## 2020-02-04 LAB
ATRIAL RATE: 94 BPM
P AXIS: 41 DEGREES
PR INTERVAL: 146 MS
QRS AXIS: 66 DEGREES
QRSD INTERVAL: 92 MS
QT INTERVAL: 342 MS
QTC INTERVAL: 427 MS
T WAVE AXIS: 30 DEGREES
VENTRICULAR RATE: 94 BPM

## 2020-02-04 PROCEDURE — 93010 ELECTROCARDIOGRAM REPORT: CPT | Performed by: INTERNAL MEDICINE

## 2020-02-04 NOTE — ED PROVIDER NOTES
History  Chief Complaint   Patient presents with    Flu Symptoms     body aches, fever 102  took theraflu which helped some symptoms  "coughed up some shit this morning " also reports being 4 weeks late on her menses     49-year-old female presents to the emergency room with flu-like symptoms  Patient states that she developed generalized body aches, fever to 102, and cough a few days ago  Patient reports that her  is here with similar symptoms  She also c/o constant back and lower chest pain - worse with deep breathing  She denies any sob,  n/v/d, urinary symptoms, abd pain, or sore throat  States that she took theraflu today without any relief  She states that she did not get her flu shot this year  She also reports that she is 4 weeks late on menses and thinks she may be pregnant  History provided by:  Patient  Flu Symptoms   Presenting symptoms: cough, fever and myalgias    Presenting symptoms: no diarrhea, no headaches, no nausea, no shortness of breath, no sore throat and no vomiting    Severity:  Moderate  Onset quality:  Sudden  Progression:  Worsening  Chronicity:  New  Relieved by:  Nothing  Worsened by:  Nothing  Ineffective treatments:  OTC medications  Associated symptoms: nasal congestion    Associated symptoms: no chills, no ear pain and no neck stiffness    Risk factors: sick contacts        Prior to Admission Medications   Prescriptions Last Dose Informant Patient Reported? Taking?    olmesartan (BENICAR) 20 mg tablet  Self Yes No   Sig: Take 20 mg by mouth daily as needed      Facility-Administered Medications: None       Past Medical History:   Diagnosis Date    Cancer of left breast (HCC)     Cervical radiculopathy at C5     CTS (carpal tunnel syndrome)     Failed neck syndrome     Headache     HNP (herniated nucleus pulposus), cervical     Numbness and tingling in left arm     Stroke (HCC)     residual loss of sensation in leg       Past Surgical History:   Procedure Laterality Date    ANKLE SURGERY      BREAST SURGERY      precancer cells    CERVICAL FUSION      LASIK      SPINAL FUSION      TONSILLECTOMY AND ADENOIDECTOMY         Family History   Problem Relation Age of Onset    Diabetes Mother     Arthritis Mother     Hypertension Mother      I have reviewed and agree with the history as documented  Social History     Tobacco Use    Smoking status: Never Smoker    Smokeless tobacco: Never Used   Substance Use Topics    Alcohol use: No    Drug use: No        Review of Systems   Constitutional: Positive for fever  Negative for chills  HENT: Positive for congestion  Negative for ear pain and sore throat  Eyes: Negative for pain and visual disturbance  Respiratory: Positive for cough  Negative for shortness of breath and wheezing  Cardiovascular: Negative for chest pain and leg swelling  Gastrointestinal: Negative for abdominal pain, diarrhea, nausea and vomiting  Genitourinary: Negative for dysuria, frequency, hematuria and urgency  Musculoskeletal: Positive for myalgias  Negative for neck pain and neck stiffness  Skin: Negative for rash and wound  Neurological: Negative for weakness, numbness and headaches  Psychiatric/Behavioral: Negative for agitation and confusion  All other systems reviewed and are negative  Physical Exam  Physical Exam   Constitutional: She is oriented to person, place, and time  She appears well-developed and well-nourished  HENT:   Head: Normocephalic and atraumatic  Mouth/Throat: Oropharynx is clear and moist    Eyes: Pupils are equal, round, and reactive to light  EOM are normal    Neck: Normal range of motion  Neck supple  Cardiovascular: Normal rate and regular rhythm  Pulmonary/Chest: Effort normal and breath sounds normal  No stridor  No respiratory distress  She has no wheezes  She has no rales  Abdominal: Soft  Bowel sounds are normal  She exhibits no distension  There is no tenderness  Musculoskeletal: Normal range of motion  She exhibits no edema  Neurological: She is alert and oriented to person, place, and time  No focal deficits   Skin: Skin is warm and dry  Nursing note and vitals reviewed        Vital Signs  ED Triage Vitals [02/03/20 1514]   Temperature Pulse Respirations Blood Pressure SpO2   98 3 °F (36 8 °C) (!) 109 18 163/88 96 %      Temp Source Heart Rate Source Patient Position - Orthostatic VS BP Location FiO2 (%)   Oral Monitor Sitting Left arm --      Pain Score       --           Vitals:    02/03/20 1833 02/03/20 1900 02/03/20 1930 02/03/20 2030   BP: 136/74 (!) 150/103 142/90 142/82   Pulse: 99 101 94 86   Patient Position - Orthostatic VS: Lying            Visual Acuity      ED Medications  Medications   ketorolac (TORADOL) injection 15 mg (15 mg Intravenous Given 2/3/20 1828)       Diagnostic Studies  Results Reviewed     Procedure Component Value Units Date/Time    Comprehensive metabolic panel [[id number]]  (Abnormal) Collected:  02/03/20 1900    Lab Status:  Final result Specimen:  Blood from Arm, Left Updated:  02/03/20 1936     Sodium 137 mmol/L      Potassium 3 7 mmol/L      Chloride 101 mmol/L      CO2 24 mmol/L      ANION GAP 12 mmol/L      BUN 12 mg/dL      Creatinine 0 80 mg/dL      Glucose 133 mg/dL      Calcium 8 7 mg/dL      AST 16 U/L      ALT 39 U/L      Alkaline Phosphatase 51 U/L      Total Protein 7 9 g/dL      Albumin 3 4 g/dL      Total Bilirubin 1 10 mg/dL      eGFR 95 ml/min/1 73sq m     Narrative:       Arya guidelines for Chronic Kidney Disease (CKD):     Stage 1 with normal or high GFR (GFR > 90 mL/min/1 73 square meters)    Stage 2 Mild CKD (GFR = 60-89 mL/min/1 73 square meters)    Stage 3A Moderate CKD (GFR = 45-59 mL/min/1 73 square meters)    Stage 3B Moderate CKD (GFR = 30-44 mL/min/1 73 square meters)    Stage 4 Severe CKD (GFR = 15-29 mL/min/1 73 square meters)    Stage 5 End Stage CKD (GFR <15 mL/min/1 73 square meters)  Note: GFR calculation is accurate only with a steady state creatinine    Troponin I [[id number]]  (Normal) Collected:  02/03/20 1826    Lab Status:  Final result Specimen:  Blood from Arm, Left Updated:  02/03/20 1902     Troponin I <0 02 ng/mL     D-dimer, quantitative [[id number]]  (Normal) Collected:  02/03/20 1826    Lab Status:  Final result Specimen:  Blood from Arm, Left Updated:  02/03/20 1900     D-Dimer, Quant <0 27 ug/ml FEU     CBC and differential [[id number]]  (Abnormal) Collected:  02/03/20 1826    Lab Status:  Final result Specimen:  Blood from Arm, Left Updated:  02/03/20 1844     WBC 23 76 Thousand/uL      RBC 4 48 Million/uL      Hemoglobin 13 4 g/dL      Hematocrit 40 2 %      MCV 90 fL      MCH 29 9 pg      MCHC 33 3 g/dL      RDW 14 0 %      MPV 10 3 fL      Platelets 910 Thousands/uL      nRBC 0 /100 WBCs      Neutrophils Relative 86 %      Immat GRANS % 0 %      Lymphocytes Relative 10 %      Monocytes Relative 4 %      Eosinophils Relative 0 %      Basophils Relative 0 %      Neutrophils Absolute 20 20 Thousands/µL      Immature Grans Absolute 0 10 Thousand/uL      Lymphocytes Absolute 2 37 Thousands/µL      Monocytes Absolute 0 95 Thousand/µL      Eosinophils Absolute 0 09 Thousand/µL      Basophils Absolute 0 05 Thousands/µL     UA w Reflex to Microscopic w Reflex to Culture [[id number]] Collected:  02/03/20 1730    Lab Status:  Final result Specimen:  Urine, Clean Catch Updated:  02/03/20 1755     Color, UA Yellow     Clarity, UA Clear     Specific Gravity, UA 1 025     pH, UA 5 5     Leukocytes, UA Negative     Nitrite, UA Negative     Protein, UA Negative mg/dl      Glucose, UA Negative mg/dl      Ketones, UA Negative mg/dl      Urobilinogen, UA 0 2 E U /dl      Bilirubin, UA Negative     Blood, UA Negative    POCT pregnancy, urine [[id number]]  (Normal) Resulted:  02/03/20 1735    Lab Status:  Final result Updated:  02/03/20 1735     EXT PREG TEST UR (Ref: Negative) negative     Control valid    Influenza A/B and RSV PCR [[id number]]  (Normal) Collected:  02/03/20 1519    Lab Status:  Final result Specimen:  Nasopharyngeal Swab Updated:  02/03/20 1603     INFLUENZA A PCR None Detected     INFLUENZA B PCR None Detected     RSV PCR None Detected                 X-ray chest 2 views   ED Interpretation by So Hines DO (02/03 1937)   NAP                  Procedures  ECG 12 Lead Documentation Only  Date/Time: 2/3/2020 11:02 PM  Performed by: So Hines DO  Authorized by: So Hines DO     Indications / Diagnosis:  Back and lower chest pain   Patient location:  ED  Previous ECG:     Previous ECG:  Unavailable  Rate:     ECG rate:  94    ECG rate assessment: normal    Rhythm:     Rhythm: sinus rhythm    Ectopy:     Ectopy: none    QRS:     QRS axis:  Normal    QRS intervals:  Normal  ST segments:     ST segments:  Normal             ED Course  ED Course as of Feb 03 2303   Mon Feb 03, 2020   1736 PREGNANCY TEST URINE: negative                               MDM  Number of Diagnoses or Management Options  Influenza-like illness: new and requires workup  Viral syndrome: new and requires workup  Diagnosis management comments: Patient with flu-like symptoms and pleuritic chest/back pain  Will get flu, labs including cardiac workup, D-dimer, chest x-ray, and pregnancy test  Will reassess for dispo  Patient reevaluated and feels improved  Patient updated on results of tests  Discharge instructions given including follow-up, and return precautions  Patient demonstrates verbal understanding and agrees with plan         Amount and/or Complexity of Data Reviewed  Clinical lab tests: ordered and reviewed  Tests in the radiology section of CPT®: ordered and reviewed  Tests in the medicine section of CPT®: ordered and reviewed  Discussion of test results with the performing providers: yes  Decide to obtain previous medical records or to obtain history from someone other than the patient: yes  Obtain history from someone other than the patient: yes  Review and summarize past medical records: yes  Discuss the patient with other providers: yes  Independent visualization of images, tracings, or specimens: yes    Patient Progress  Patient progress: improved        Disposition  Final diagnoses:   Influenza-like illness   Viral syndrome     Time reflects when diagnosis was documented in both MDM as applicable and the Disposition within this note     Time User Action Codes Description Comment    2/3/2020  8:31 PM Terrance Fleming A Add [R69] Influenza-like illness     2/3/2020  8:31 PM Yong Fleming [place] [address] Add [B34 9] Viral syndrome       ED Disposition     ED Disposition Condition Date/Time Comment    Discharge Stable Mon Feb 3, 2020  8:31 PM Talia Hinojosa discharge to home/self care  Follow-up Information     Follow up With Specialties Details Why Contact Info Additional Information    Infolink  Call  To establish a family doctor if you do not already have one [facility] Emergency Department Emergency Medicine Go to  immediately for any new or worsening symptoms  [address] [facility] [address] ED, [address] [place], [place], [facility]          Discharge Medication List as of 2/3/2020  8:33 PM      CONTINUE these medications which have NOT CHANGED    Details   olmesartan (BENICAR) 20 mg tablet Take 20 mg by mouth daily as needed, Historical Med           No discharge procedures on file      ED Provider  Electronically Signed by           Toni Brownlee DO  02/03/20 8846

## 2020-06-12 ENCOUNTER — HOSPITAL ENCOUNTER (EMERGENCY)
Facility: HOSPITAL | Age: 35
Discharge: HOME | End: 2020-06-12
Attending: EMERGENCY MEDICINE | Admitting: EMERGENCY MEDICINE

## 2020-06-12 ENCOUNTER — APPOINTMENT (EMERGENCY)
Dept: CT IMAGING | Facility: HOSPITAL | Age: 35
End: 2020-06-12

## 2020-06-12 VITALS
OXYGEN SATURATION: 100 % | RESPIRATION RATE: 18 BRPM | TEMPERATURE: 98.1 F | DIASTOLIC BLOOD PRESSURE: 64 MMHG | SYSTOLIC BLOOD PRESSURE: 126 MMHG | HEART RATE: 74 BPM

## 2020-06-12 DIAGNOSIS — G44.319 ACUTE POST-TRAUMATIC HEADACHE: Primary | ICD-10-CM

## 2020-06-12 LAB
EXT PREG TEST URINE: NEGATIVE
EXT. CONTROL ED NAV: NORMAL

## 2020-06-12 PROCEDURE — 70450 CT HEAD/BRAIN W/O DYE: CPT

## 2020-06-12 PROCEDURE — 96365 THER/PROPH/DIAG IV INF INIT: CPT

## 2020-06-12 PROCEDURE — 99284 EMERGENCY DEPT VISIT MOD MDM: CPT | Performed by: EMERGENCY MEDICINE

## 2020-06-12 PROCEDURE — 99284 EMERGENCY DEPT VISIT MOD MDM: CPT

## 2020-06-12 PROCEDURE — 81025 URINE PREGNANCY TEST: CPT | Performed by: EMERGENCY MEDICINE

## 2020-06-12 PROCEDURE — 96375 TX/PRO/DX INJ NEW DRUG ADDON: CPT

## 2020-06-12 RX ORDER — DIPHENHYDRAMINE HYDROCHLORIDE 50 MG/ML
50 INJECTION INTRAMUSCULAR; INTRAVENOUS ONCE
Status: COMPLETED | OUTPATIENT
Start: 2020-06-12 | End: 2020-06-12

## 2020-06-12 RX ORDER — BUTALBITAL, ACETAMINOPHEN AND CAFFEINE 50; 325; 40 MG/1; MG/1; MG/1
1 TABLET ORAL EVERY 4 HOURS PRN
Qty: 12 TABLET | Refills: 0 | Status: SHIPPED | OUTPATIENT
Start: 2020-06-12 | End: 2020-10-22

## 2020-06-12 RX ORDER — ONDANSETRON 4 MG/1
4 TABLET, ORALLY DISINTEGRATING ORAL EVERY 8 HOURS PRN
Qty: 12 TABLET | Refills: 0 | Status: SHIPPED | OUTPATIENT
Start: 2020-06-12 | End: 2020-10-22

## 2020-06-12 RX ORDER — MAGNESIUM SULFATE HEPTAHYDRATE 40 MG/ML
2 INJECTION, SOLUTION INTRAVENOUS ONCE
Status: COMPLETED | OUTPATIENT
Start: 2020-06-12 | End: 2020-06-12

## 2020-06-12 RX ORDER — METOCLOPRAMIDE HYDROCHLORIDE 5 MG/ML
10 INJECTION INTRAMUSCULAR; INTRAVENOUS ONCE
Status: COMPLETED | OUTPATIENT
Start: 2020-06-12 | End: 2020-06-12

## 2020-06-12 RX ORDER — KETOROLAC TROMETHAMINE 30 MG/ML
30 INJECTION, SOLUTION INTRAMUSCULAR; INTRAVENOUS ONCE
Status: COMPLETED | OUTPATIENT
Start: 2020-06-12 | End: 2020-06-12

## 2020-06-12 RX ADMIN — MAGNESIUM SULFATE HEPTAHYDRATE 2 G: 40 INJECTION, SOLUTION INTRAVENOUS at 12:39

## 2020-06-12 RX ADMIN — DIPHENHYDRAMINE HYDROCHLORIDE 50 MG: 50 INJECTION, SOLUTION INTRAMUSCULAR; INTRAVENOUS at 12:37

## 2020-06-12 RX ADMIN — KETOROLAC TROMETHAMINE 30 MG: 30 INJECTION, SOLUTION INTRAMUSCULAR at 14:04

## 2020-06-12 RX ADMIN — SODIUM CHLORIDE 1000 ML: 0.9 INJECTION, SOLUTION INTRAVENOUS at 12:28

## 2020-06-12 RX ADMIN — METOCLOPRAMIDE HYDROCHLORIDE 10 MG: 5 INJECTION INTRAMUSCULAR; INTRAVENOUS at 12:38

## 2020-10-21 RX ORDER — LIRAGLUTIDE 6 MG/ML
INJECTION SUBCUTANEOUS
COMMUNITY
End: 2020-10-22

## 2020-10-21 RX ORDER — CLOBETASOL PROPIONATE 0.5 MG/G
CREAM TOPICAL
COMMUNITY
End: 2020-10-22

## 2020-10-21 RX ORDER — ALPRAZOLAM 0.25 MG/1
TABLET ORAL
COMMUNITY
End: 2020-10-22

## 2020-10-21 RX ORDER — MEGESTROL ACETATE 20 MG/1
TABLET ORAL
COMMUNITY
End: 2020-10-22

## 2020-10-21 RX ORDER — CARISOPRODOL 350 MG/1
TABLET ORAL
COMMUNITY
End: 2020-10-22

## 2020-10-21 RX ORDER — CYCLOBENZAPRINE HCL 10 MG
TABLET ORAL
COMMUNITY
End: 2020-10-22

## 2020-10-21 RX ORDER — ACETAMINOPHEN AND CODEINE PHOSPHATE 300; 30 MG/1; MG/1
TABLET ORAL
COMMUNITY
End: 2020-10-22

## 2020-10-21 RX ORDER — NAPROXEN SODIUM 550 MG/1
TABLET ORAL
COMMUNITY
End: 2020-10-22

## 2020-10-21 RX ORDER — TRAMADOL HYDROCHLORIDE 50 MG/1
TABLET ORAL EVERY 6 HOURS
COMMUNITY
End: 2020-10-22

## 2020-10-21 RX ORDER — ETONOGESTREL AND ETHINYL ESTRADIOL 11.7; 2.7 MG/1; MG/1
INSERT, EXTENDED RELEASE VAGINAL
COMMUNITY
End: 2020-10-22

## 2020-10-21 RX ORDER — OXYCODONE AND ACETAMINOPHEN 7.5; 325 MG/1; MG/1
TABLET ORAL
COMMUNITY
End: 2020-10-22

## 2020-10-21 RX ORDER — CLINDAMYCIN PHOSPHATE 10 UG/ML
LOTION TOPICAL
COMMUNITY
End: 2020-10-22

## 2020-10-21 RX ORDER — DESLORATADINE 5 MG/1
TABLET ORAL
COMMUNITY
End: 2020-10-22

## 2020-10-21 RX ORDER — LOSARTAN POTASSIUM 50 MG/1
50 TABLET ORAL DAILY
COMMUNITY
End: 2021-08-09 | Stop reason: SDUPTHER

## 2020-10-21 RX ORDER — CEPHALEXIN 500 MG/1
CAPSULE ORAL
COMMUNITY
End: 2020-10-22

## 2020-10-21 RX ORDER — FLUTICASONE PROPIONATE 50 MCG
SPRAY, SUSPENSION (ML) NASAL
COMMUNITY
End: 2020-10-22

## 2020-10-21 RX ORDER — DULOXETIN HYDROCHLORIDE 30 MG/1
CAPSULE, DELAYED RELEASE ORAL
COMMUNITY
End: 2020-10-22

## 2020-10-21 RX ORDER — ADAPALENE AND BENZOYL PEROXIDE .1; 2.5 G/100G; G/100G
GEL TOPICAL
COMMUNITY
End: 2020-10-22

## 2020-10-21 RX ORDER — MELOXICAM 15 MG/1
TABLET ORAL
COMMUNITY
End: 2021-04-22 | Stop reason: SDUPTHER

## 2020-10-21 RX ORDER — OXYCODONE HYDROCHLORIDE 5 MG/1
CAPSULE ORAL
COMMUNITY
End: 2020-10-22

## 2020-10-21 RX ORDER — FLUCONAZOLE 150 MG/1
TABLET ORAL
COMMUNITY
End: 2020-10-22

## 2020-10-21 RX ORDER — DIAZEPAM 5 MG/1
TABLET ORAL EVERY 8 HOURS
COMMUNITY
End: 2020-10-22

## 2020-10-21 RX ORDER — DICYCLOMINE HCL 20 MG
TABLET ORAL
COMMUNITY
End: 2020-10-22

## 2020-10-21 RX ORDER — PREGABALIN 50 MG/1
CAPSULE ORAL EVERY 12 HOURS
COMMUNITY
End: 2020-10-22

## 2020-10-21 RX ORDER — SULINDAC 200 MG/1
TABLET ORAL
COMMUNITY
End: 2020-10-22

## 2020-10-21 RX ORDER — IBUPROFEN 800 MG/1
TABLET ORAL 3 TIMES DAILY
COMMUNITY
End: 2020-10-22

## 2020-10-22 ENCOUNTER — OFFICE VISIT (OUTPATIENT)
Dept: GASTROENTEROLOGY | Facility: CLINIC | Age: 35
End: 2020-10-22

## 2020-10-22 VITALS
SYSTOLIC BLOOD PRESSURE: 124 MMHG | WEIGHT: 288.8 LBS | HEIGHT: 68 IN | HEART RATE: 55 BPM | BODY MASS INDEX: 43.77 KG/M2 | TEMPERATURE: 97.6 F | DIASTOLIC BLOOD PRESSURE: 88 MMHG

## 2020-10-22 DIAGNOSIS — K64.9 HEMORRHOIDS, UNSPECIFIED HEMORRHOID TYPE: Primary | ICD-10-CM

## 2020-10-22 PROCEDURE — 99203 OFFICE O/P NEW LOW 30 MIN: CPT | Performed by: PHYSICIAN ASSISTANT

## 2020-10-22 RX ORDER — HYDROCORTISONE ACETATE 25 MG/1
25 SUPPOSITORY RECTAL
Qty: 14 SUPPOSITORY | Refills: 0 | Status: SHIPPED | OUTPATIENT
Start: 2020-10-22

## 2020-11-09 ENCOUNTER — HOSPITAL ENCOUNTER (EMERGENCY)
Facility: HOSPITAL | Age: 35
Discharge: HOME | End: 2020-11-09
Attending: EMERGENCY MEDICINE

## 2020-11-09 VITALS
SYSTOLIC BLOOD PRESSURE: 130 MMHG | OXYGEN SATURATION: 98 % | HEIGHT: 68 IN | TEMPERATURE: 98 F | DIASTOLIC BLOOD PRESSURE: 77 MMHG | BODY MASS INDEX: 44.04 KG/M2 | WEIGHT: 290.57 LBS | HEART RATE: 73 BPM | RESPIRATION RATE: 18 BRPM

## 2020-11-09 DIAGNOSIS — R51.9 NONINTRACTABLE HEADACHE: Primary | ICD-10-CM

## 2020-11-09 PROCEDURE — 96365 THER/PROPH/DIAG IV INF INIT: CPT

## 2020-11-09 PROCEDURE — 96375 TX/PRO/DX INJ NEW DRUG ADDON: CPT

## 2020-11-09 PROCEDURE — 99284 EMERGENCY DEPT VISIT MOD MDM: CPT | Performed by: PHYSICIAN ASSISTANT

## 2020-11-09 PROCEDURE — 99283 EMERGENCY DEPT VISIT LOW MDM: CPT

## 2020-11-09 RX ORDER — KETOROLAC TROMETHAMINE 30 MG/ML
30 INJECTION, SOLUTION INTRAMUSCULAR; INTRAVENOUS ONCE
Status: COMPLETED | OUTPATIENT
Start: 2020-11-09 | End: 2020-11-09

## 2020-11-09 RX ORDER — ACETAMINOPHEN 325 MG/1
975 TABLET ORAL ONCE
Status: COMPLETED | OUTPATIENT
Start: 2020-11-09 | End: 2020-11-09

## 2020-11-09 RX ORDER — DEXAMETHASONE SODIUM PHOSPHATE 10 MG/ML
10 INJECTION, SOLUTION INTRAMUSCULAR; INTRAVENOUS ONCE
Status: COMPLETED | OUTPATIENT
Start: 2020-11-09 | End: 2020-11-09

## 2020-11-09 RX ORDER — METOCLOPRAMIDE 10 MG/1
10 TABLET ORAL EVERY 6 HOURS
Qty: 30 TABLET | Refills: 0 | Status: SHIPPED | OUTPATIENT
Start: 2020-11-09

## 2020-11-09 RX ORDER — MAGNESIUM SULFATE HEPTAHYDRATE 40 MG/ML
2 INJECTION, SOLUTION INTRAVENOUS ONCE
Status: COMPLETED | OUTPATIENT
Start: 2020-11-09 | End: 2020-11-09

## 2020-11-09 RX ORDER — METOCLOPRAMIDE HYDROCHLORIDE 5 MG/ML
10 INJECTION INTRAMUSCULAR; INTRAVENOUS ONCE
Status: COMPLETED | OUTPATIENT
Start: 2020-11-09 | End: 2020-11-09

## 2020-11-09 RX ORDER — DIPHENHYDRAMINE HYDROCHLORIDE 50 MG/ML
25 INJECTION INTRAMUSCULAR; INTRAVENOUS ONCE
Status: COMPLETED | OUTPATIENT
Start: 2020-11-09 | End: 2020-11-09

## 2020-11-09 RX ADMIN — ACETAMINOPHEN 975 MG: 325 TABLET, FILM COATED ORAL at 16:39

## 2020-11-09 RX ADMIN — DEXAMETHASONE SODIUM PHOSPHATE 10 MG: 10 INJECTION, SOLUTION INTRAMUSCULAR; INTRAVENOUS at 16:39

## 2020-11-09 RX ADMIN — DIPHENHYDRAMINE HYDROCHLORIDE 25 MG: 50 INJECTION, SOLUTION INTRAMUSCULAR; INTRAVENOUS at 15:12

## 2020-11-09 RX ADMIN — METOCLOPRAMIDE HYDROCHLORIDE 10 MG: 5 INJECTION INTRAMUSCULAR; INTRAVENOUS at 15:12

## 2020-11-09 RX ADMIN — KETOROLAC TROMETHAMINE 30 MG: 30 INJECTION, SOLUTION INTRAMUSCULAR at 15:12

## 2020-11-09 RX ADMIN — SODIUM CHLORIDE 1000 ML: 0.9 INJECTION, SOLUTION INTRAVENOUS at 15:11

## 2020-11-09 RX ADMIN — MAGNESIUM SULFATE HEPTAHYDRATE 2 G: 40 INJECTION, SOLUTION INTRAVENOUS at 15:12

## 2020-11-10 ENCOUNTER — TELEPHONE (OUTPATIENT)
Dept: NEUROLOGY | Facility: CLINIC | Age: 35
End: 2020-11-10

## 2020-11-27 ENCOUNTER — TELEMEDICINE (OUTPATIENT)
Dept: NEUROLOGY | Facility: CLINIC | Age: 35
End: 2020-11-27

## 2020-11-27 DIAGNOSIS — S06.0X0A CEREBRAL CONCUSSION, WITHOUT LOSS OF CONSCIOUSNESS, INITIAL ENCOUNTER: ICD-10-CM

## 2020-11-27 DIAGNOSIS — G43.711 INTRACTABLE CHRONIC MIGRAINE WITHOUT AURA AND WITH STATUS MIGRAINOSUS: Primary | ICD-10-CM

## 2020-11-27 PROCEDURE — 99214 OFFICE O/P EST MOD 30 MIN: CPT | Performed by: PSYCHIATRY & NEUROLOGY

## 2020-11-27 RX ORDER — GABAPENTIN 100 MG/1
100 CAPSULE ORAL 3 TIMES DAILY
COMMUNITY
Start: 2020-11-10 | End: 2021-04-22 | Stop reason: SDUPTHER

## 2020-11-27 RX ORDER — SUMATRIPTAN 100 MG/1
100 TABLET, FILM COATED ORAL ONCE AS NEEDED
Qty: 9 TABLET | Refills: 2 | Status: SHIPPED | OUTPATIENT
Start: 2020-11-27 | End: 2021-04-22 | Stop reason: SDUPTHER

## 2020-11-27 RX ORDER — TOPIRAMATE 25 MG/1
25 TABLET ORAL
Qty: 30 TABLET | Refills: 3 | Status: SHIPPED | OUTPATIENT
Start: 2020-11-27 | End: 2021-02-19

## 2021-02-19 DIAGNOSIS — G43.711 INTRACTABLE CHRONIC MIGRAINE WITHOUT AURA AND WITH STATUS MIGRAINOSUS: ICD-10-CM

## 2021-02-19 RX ORDER — TOPIRAMATE 25 MG/1
TABLET ORAL
Qty: 90 TABLET | Refills: 1 | Status: SHIPPED | OUTPATIENT
Start: 2021-02-19 | End: 2021-04-22 | Stop reason: ALTCHOICE

## 2021-04-01 ENCOUNTER — TELEPHONE (OUTPATIENT)
Dept: NEUROLOGY | Facility: CLINIC | Age: 36
End: 2021-04-01

## 2021-04-01 NOTE — TELEPHONE ENCOUNTER
LORENA; patient has an upcoming appointment with Dr Devin Bledsoe on 04/22/2021   He ordered for her to have an MRI done, just wanted to give her a friendly reminder

## 2021-04-22 ENCOUNTER — OFFICE VISIT (OUTPATIENT)
Dept: NEUROLOGY | Facility: CLINIC | Age: 36
End: 2021-04-22

## 2021-04-22 VITALS
WEIGHT: 288 LBS | HEART RATE: 82 BPM | DIASTOLIC BLOOD PRESSURE: 80 MMHG | RESPIRATION RATE: 16 BRPM | HEIGHT: 68 IN | BODY MASS INDEX: 43.65 KG/M2 | SYSTOLIC BLOOD PRESSURE: 120 MMHG

## 2021-04-22 DIAGNOSIS — M70.62 TROCHANTERIC BURSITIS OF LEFT HIP: ICD-10-CM

## 2021-04-22 DIAGNOSIS — G57.12 MERALGIA PARAESTHETICA, LEFT: Primary | ICD-10-CM

## 2021-04-22 DIAGNOSIS — G43.711 INTRACTABLE CHRONIC MIGRAINE WITHOUT AURA AND WITH STATUS MIGRAINOSUS: ICD-10-CM

## 2021-04-22 PROCEDURE — 99214 OFFICE O/P EST MOD 30 MIN: CPT | Performed by: PSYCHIATRY & NEUROLOGY

## 2021-04-22 RX ORDER — MELOXICAM 15 MG/1
15 TABLET ORAL ONCE AS NEEDED
Qty: 30 TABLET | Refills: 1 | Status: SHIPPED | OUTPATIENT
Start: 2021-04-22 | End: 2021-06-23

## 2021-04-22 RX ORDER — SUMATRIPTAN 100 MG/1
100 TABLET, FILM COATED ORAL ONCE AS NEEDED
Qty: 9 TABLET | Refills: 2 | Status: SHIPPED | OUTPATIENT
Start: 2021-04-22 | End: 2021-10-29 | Stop reason: ALTCHOICE

## 2021-04-22 RX ORDER — GABAPENTIN 100 MG/1
100 CAPSULE ORAL 3 TIMES DAILY
Qty: 90 CAPSULE | Refills: 1 | Status: SHIPPED | OUTPATIENT
Start: 2021-04-22

## 2021-04-22 NOTE — PROGRESS NOTES
Progress Note - Neurology   Talia Hinojosa 38 y o  female MRN: [id number]  Unit/Bed#:  Encounter: [id number]      Subjective:     Patient is here for a follow-up visit for cerebral concussion, postconcussion migraines, and has seen significant relief when she was started on topiramate 25 mg at bedtime and uses sumatriptan on a p r n  basis, patient claims she use the topiramate for a few weeks and then discontinue the same since her headaches had resolved, and occasionally gets her migraine headache which is generally relieved with sumatriptan currently getting headaches 1 or 2 times a month  In the past the patient also claims she has had a stroke several years ago for which she was seen by another physician, and continues to experience numbness of the left leg, and describes tingling and numbness with hyperesthesia along the anterolateral aspect of the left thigh in a ring-like distribution which worsens with any kind of  Minor pressure and was using gabapentin for the same from 800-1800 mg a day and the symptoms were severe  She also describes pain in the left hip with difficulty ambulating at times  Denies any significant low back pain, or motor or sensory symptoms distally in the lower extremities  She also denies any bladder bowel symptoms  Patient has a high BMI     ROS:   Review of Systems   Constitutional: Negative  Negative for appetite change and fever  HENT: Negative  Negative for hearing loss, tinnitus, trouble swallowing and voice change  Eyes: Negative  Negative for photophobia and pain  Respiratory: Negative  Negative for shortness of breath  Cardiovascular: Negative  Negative for palpitations  Gastrointestinal: Negative  Negative for nausea and vomiting  Endocrine: Negative  Negative for cold intolerance  Genitourinary: Negative  Negative for dysuria, frequency and urgency  Musculoskeletal: Negative  Negative for myalgias and neck pain          Left leg pain Skin: Negative  Negative for rash  Neurological: Negative  Negative for dizziness, tremors, seizures, syncope, facial asymmetry, speech difficulty, weakness, light-headedness, numbness and headaches  Hematological: Negative  Does not bruise/bleed easily  Psychiatric/Behavioral: Negative  Negative for confusion, hallucinations and sleep disturbance  MA review of systems was reviewed by myself  Vitals:   Vitals:    04/22/21 1009   BP: 120/80   BP Location: Left arm   Patient Position: Sitting   Cuff Size: Standard   Pulse: 82   Resp: 16   Weight: 131 kg (288 lb)   Height: 5' 8" (1 727 m)   ,Body mass index is 43 79 kg/m²      MEDS:      Current Outpatient Medications:     gabapentin (NEURONTIN) 100 mg capsule, Take 100 mg by mouth 3 (three) times a day, Disp: , Rfl:     hydrocortisone (ANUSOL-HC) 25 mg suppository, Insert 1 suppository (25 mg total) into the rectum daily at bedtime, Disp: 14 suppository, Rfl: 0    losartan (COZAAR) 25 mg tablet, , Disp: , Rfl:     meloxicam (MOBIC) 15 mg tablet, meloxicam 15 mg tablet  TAKE 1 TABLET BY MOUTH EVERY DAY WITH MEALS, Disp: , Rfl:     metoclopramide (REGLAN) 10 mg tablet, Take 1 tablet (10 mg total) by mouth every 6 (six) hours, Disp: 30 tablet, Rfl: 0    Multiple Vitamin (MULTI-VITAMIN PO), Multi Vitamin  1 PO QD, Disp: , Rfl:     SUMAtriptan (IMITREX) 100 mg tablet, Take 1 tablet (100 mg total) by mouth once as needed for migraine for up to 1 dose, Disp: 9 tablet, Rfl: 2    topiramate (TOPAMAX) 25 mg tablet, TAKE 1 TABLET BY MOUTH DAILY AT BEDTIME, Disp: 90 tablet, Rfl: 1  :    Physical Exam:  General appearance: alert, appears stated age and cooperative  Head: Normocephalic, without obvious abnormality, atraumatic      On neurological examination patient is alert awake oriented, speech is fluent, cranial nerves 2-12 intact, and on motor and sensory exam there is no evidence of any focal weakness in the upper or lower extremities, deep tendon reflexes are preserved, and patient has an area of hyperesthesia along the anterolateral aspect of the left thigh  There is no evidence of any groin tenderness  She also has significant tenderness along the greater trochanter of the left hip  Her gait is normal based, no evidence of any dysmetria was noted  Lab Results: I have personally reviewed pertinent reports  Imaging Studies: I have personally reviewed pertinent reports  Assessment:  1  Chronic migraine headaches with improvement  2  Left-sided meralgia paresthetica  3  Left greater trochanteric bursitis  Plan:    Patient is advised to limit dosing of gabapentin  To 100 mg 3 times a day as needed due to high BMI, will also give a meloxicam 15 mg daily to see if it helps with her hip pain, physical therapy will be helpful, to the left hip region and patient encouraged to go on a home exercise program   She may continue with sumatriptan on a p r n  basis and will return back to see me in 6 months       4/22/2021,10:11 AM    Dictation voice to text software has been used in the creation of this document  Please consider this in light of any contextual or grammatical errors

## 2021-05-23 ENCOUNTER — APPOINTMENT (EMERGENCY)
Dept: RADIOLOGY | Facility: HOSPITAL | Age: 36
End: 2021-05-23
Payer: COMMERCIAL

## 2021-05-23 ENCOUNTER — HOSPITAL ENCOUNTER (EMERGENCY)
Facility: HOSPITAL | Age: 36
Discharge: HOME | End: 2021-05-23
Attending: EMERGENCY MEDICINE | Admitting: EMERGENCY MEDICINE
Payer: COMMERCIAL

## 2021-05-23 VITALS
HEART RATE: 77 BPM | RESPIRATION RATE: 18 BRPM | DIASTOLIC BLOOD PRESSURE: 81 MMHG | TEMPERATURE: 98.3 F | SYSTOLIC BLOOD PRESSURE: 140 MMHG | OXYGEN SATURATION: 100 %

## 2021-05-23 DIAGNOSIS — R06.00 DYSPNEA: Primary | ICD-10-CM

## 2021-05-23 DIAGNOSIS — J45.901 ASTHMA EXACERBATION: ICD-10-CM

## 2021-05-23 LAB
ANION GAP SERPL CALCULATED.3IONS-SCNC: 11 MMOL/L (ref 4–13)
BASOPHILS # BLD AUTO: 0.04 THOUSANDS/ΜL (ref 0–0.1)
BASOPHILS NFR BLD AUTO: 0 % (ref 0–1)
BUN SERPL-MCNC: 12 MG/DL (ref 5–25)
CALCIUM SERPL-MCNC: 9.3 MG/DL (ref 8.3–10.1)
CHLORIDE SERPL-SCNC: 104 MMOL/L (ref 100–108)
CO2 SERPL-SCNC: 25 MMOL/L (ref 21–32)
CREAT SERPL-MCNC: 0.78 MG/DL (ref 0.6–1.3)
CRP SERPL QL: 20.8 MG/L
EOSINOPHIL # BLD AUTO: 0.13 THOUSAND/ΜL (ref 0–0.61)
EOSINOPHIL NFR BLD AUTO: 1 % (ref 0–6)
ERYTHROCYTE [DISTWIDTH] IN BLOOD BY AUTOMATED COUNT: 13.8 % (ref 11.6–15.1)
ERYTHROCYTE [SEDIMENTATION RATE] IN BLOOD: 34 MM/HOUR (ref 0–19)
GFR SERPL CREATININE-BSD FRML MDRD: 97 ML/MIN/1.73SQ M
GLUCOSE SERPL-MCNC: 60 MG/DL (ref 65–140)
HCT VFR BLD AUTO: 35.4 % (ref 34.8–46.1)
HGB BLD-MCNC: 11.7 G/DL (ref 11.5–15.4)
IMM GRANULOCYTES # BLD AUTO: 0.06 THOUSAND/UL (ref 0–0.2)
IMM GRANULOCYTES NFR BLD AUTO: 1 % (ref 0–2)
LYMPHOCYTES # BLD AUTO: 2.49 THOUSANDS/ΜL (ref 0.6–4.47)
LYMPHOCYTES NFR BLD AUTO: 19 % (ref 14–44)
MCH RBC QN AUTO: 29.5 PG (ref 26.8–34.3)
MCHC RBC AUTO-ENTMCNC: 33.1 G/DL (ref 31.4–37.4)
MCV RBC AUTO: 89 FL (ref 82–98)
MONOCYTES # BLD AUTO: 0.61 THOUSAND/ΜL (ref 0.17–1.22)
MONOCYTES NFR BLD AUTO: 5 % (ref 4–12)
NEUTROPHILS # BLD AUTO: 9.56 THOUSANDS/ΜL (ref 1.85–7.62)
NEUTS SEG NFR BLD AUTO: 74 % (ref 43–75)
NRBC BLD AUTO-RTO: 0 /100 WBCS
PLATELET # BLD AUTO: 337 THOUSANDS/UL (ref 149–390)
PMV BLD AUTO: 10.1 FL (ref 8.9–12.7)
POTASSIUM SERPL-SCNC: 4 MMOL/L (ref 3.5–5.3)
RBC # BLD AUTO: 3.97 MILLION/UL (ref 3.81–5.12)
SODIUM SERPL-SCNC: 140 MMOL/L (ref 136–145)
WBC # BLD AUTO: 12.89 THOUSAND/UL (ref 4.31–10.16)

## 2021-05-23 PROCEDURE — 36415 COLL VENOUS BLD VENIPUNCTURE: CPT | Performed by: EMERGENCY MEDICINE

## 2021-05-23 PROCEDURE — 80048 BASIC METABOLIC PNL TOTAL CA: CPT | Performed by: EMERGENCY MEDICINE

## 2021-05-23 PROCEDURE — 85025 COMPLETE CBC W/AUTO DIFF WBC: CPT | Performed by: EMERGENCY MEDICINE

## 2021-05-23 PROCEDURE — 93005 ELECTROCARDIOGRAM TRACING: CPT

## 2021-05-23 PROCEDURE — 99284 EMERGENCY DEPT VISIT MOD MDM: CPT | Performed by: EMERGENCY MEDICINE

## 2021-05-23 PROCEDURE — 86140 C-REACTIVE PROTEIN: CPT | Performed by: EMERGENCY MEDICINE

## 2021-05-23 PROCEDURE — 71046 X-RAY EXAM CHEST 2 VIEWS: CPT

## 2021-05-23 PROCEDURE — 94640 AIRWAY INHALATION TREATMENT: CPT

## 2021-05-23 PROCEDURE — 99285 EMERGENCY DEPT VISIT HI MDM: CPT

## 2021-05-23 PROCEDURE — 85652 RBC SED RATE AUTOMATED: CPT | Performed by: EMERGENCY MEDICINE

## 2021-05-23 RX ORDER — ALBUTEROL SULFATE 90 UG/1
2 AEROSOL, METERED RESPIRATORY (INHALATION) EVERY 4 HOURS PRN
Qty: 8 G | Refills: 0 | Status: SHIPPED | OUTPATIENT
Start: 2021-05-23

## 2021-05-23 RX ORDER — METHYLPREDNISOLONE 4 MG/1
TABLET ORAL
Qty: 21 TABLET | Refills: 0 | Status: SHIPPED | OUTPATIENT
Start: 2021-05-23

## 2021-05-23 RX ORDER — ALBUTEROL SULFATE 90 UG/1
2 AEROSOL, METERED RESPIRATORY (INHALATION) ONCE
Status: COMPLETED | OUTPATIENT
Start: 2021-05-23 | End: 2021-05-23

## 2021-05-23 RX ORDER — ALBUTEROL SULFATE 2.5 MG/3ML
5 SOLUTION RESPIRATORY (INHALATION) ONCE
Status: COMPLETED | OUTPATIENT
Start: 2021-05-23 | End: 2021-05-23

## 2021-05-23 RX ORDER — PREDNISONE 20 MG/1
60 TABLET ORAL ONCE
Status: COMPLETED | OUTPATIENT
Start: 2021-05-23 | End: 2021-05-23

## 2021-05-23 RX ADMIN — PREDNISONE 60 MG: 20 TABLET ORAL at 15:27

## 2021-05-23 RX ADMIN — ALBUTEROL SULFATE 2 PUFF: 90 AEROSOL, METERED RESPIRATORY (INHALATION) at 15:28

## 2021-05-23 RX ADMIN — ALBUTEROL SULFATE 5 MG: 2.5 SOLUTION RESPIRATORY (INHALATION) at 15:27

## 2021-05-23 RX ADMIN — IPRATROPIUM BROMIDE 0.5 MG: 0.5 SOLUTION RESPIRATORY (INHALATION) at 15:27

## 2021-05-23 NOTE — ED NOTES
Pt reports improvement in shortness of breath after breathing treatment, provider notified        Lisbeth Alicea RN  05/23/21 [id number]

## 2021-05-23 NOTE — ED PROVIDER NOTES
History  Chief Complaint   Patient presents with    Shortness of Breath     c/o sob, ran out of inhailer, also c/o joint pain and chest pain     49-year-old female presents with asthma exacerbation  She states the she has had wheezing and dyspnea over the past day  She states that her albuterol inhaler is 3years old and it was only mildly helpful for her symptoms  She denies productive cough, no fevers or chills  No infectious symptoms  States this feels consistent with asthma exacerbation  After breathing treatment, patient feels much improved, symptoms are alleviated  She will be discharged home with albuterol inhaler and steroids  Prior to Admission Medications   Prescriptions Last Dose Informant Patient Reported? Taking?    Multiple Vitamin (MULTI-VITAMIN PO)  Self Yes No   Sig: Multi Vitamin   1 PO QD   SUMAtriptan (IMITREX) 100 mg tablet   No No   Sig: Take 1 tablet (100 mg total) by mouth once as needed for migraine for up to 1 dose   gabapentin (NEURONTIN) 100 mg capsule   No No   Sig: Take 1 capsule (100 mg total) by mouth 3 (three) times a day   hydrocortisone (ANUSOL-HC) 25 mg suppository  Self No No   Sig: Insert 1 suppository (25 mg total) into the rectum daily at bedtime   losartan (COZAAR) 50 mg tablet  Self Yes No   Sig: Take 50 mg by mouth daily    meloxicam (MOBIC) 15 mg tablet   No No   Sig: Take 1 tablet (15 mg total) by mouth once as needed for moderate pain for up to 1 dose   metoclopramide (REGLAN) 10 mg tablet  Self No No   Sig: Take 1 tablet (10 mg total) by mouth every 6 (six) hours      Facility-Administered Medications: None       Past Medical History:   Diagnosis Date    Cancer of left breast (HCC)     Cervical radiculopathy at C5     CTS (carpal tunnel syndrome)     Failed neck syndrome     Headache     HNP (herniated nucleus pulposus), cervical     Numbness and tingling in left arm     Stroke (HCC)     residual loss of sensation in leg       Past Surgical History:   Procedure Laterality Date    ANKLE SURGERY      BREAST SURGERY      precancer cells    CERVICAL FUSION      LASIK      SPINAL FUSION      TONSILLECTOMY AND ADENOIDECTOMY         Family History   Problem Relation Age of Onset    Diabetes Mother     Arthritis Mother     Hypertension Mother      I have reviewed and agree with the history as documented  E-Cigarette/Vaping    E-Cigarette Use Never User      E-Cigarette/Vaping Substances    Nicotine No     THC No     CBD No     Flavoring No     Other No     Unknown No      Social History     Tobacco Use    Smoking status: Never Smoker    Smokeless tobacco: Never Used   Substance Use Topics    Alcohol use: Never     Frequency: Never    Drug use: Never       Review of Systems   Constitutional: Negative for chills and fever  HENT: Negative for congestion and rhinorrhea  Respiratory: Positive for chest tightness, shortness of breath and wheezing  Negative for cough  Cardiovascular: Negative for chest pain and leg swelling  Gastrointestinal: Negative for abdominal pain, nausea and vomiting  Musculoskeletal: Positive for joint swelling  Negative for back pain and neck pain  Skin: Negative for wound  Neurological: Negative for dizziness and headaches  Physical Exam  Physical Exam  Vitals signs reviewed  Constitutional:       General: She is not in acute distress  Appearance: She is well-developed  She is not ill-appearing, toxic-appearing or diaphoretic  HENT:      Head: Normocephalic and atraumatic  Eyes:      Conjunctiva/sclera: Conjunctivae normal    Neck:      Musculoskeletal: Normal range of motion  Cardiovascular:      Rate and Rhythm: Normal rate and regular rhythm  Pulmonary:      Effort: Pulmonary effort is normal  No accessory muscle usage or respiratory distress  Breath sounds: Decreased breath sounds (Improved after breathing treatment) and wheezing present  No rhonchi or rales     Chest: Chest wall: Tenderness present  Musculoskeletal: Normal range of motion  Right lower leg: She exhibits no tenderness  No edema  Left lower leg: She exhibits no tenderness  No edema  Skin:     General: Skin is warm and dry  Coloration: Skin is not pale  Neurological:      Mental Status: She is alert and oriented to person, place, and time  Cranial Nerves: No cranial nerve deficit  Psychiatric:         Behavior: Behavior normal          Vital Signs  ED Triage Vitals [05/23/21 1444]   Temperature Pulse Respirations Blood Pressure SpO2   98 3 °F (36 8 °C) 84 21 119/60 98 %      Temp Source Heart Rate Source Patient Position - Orthostatic VS BP Location FiO2 (%)   Tympanic Monitor Sitting Left arm --      Pain Score       6           Vitals:    05/23/21 1444 05/23/21 1530 05/23/21 1600   BP: 119/60 101/70 140/81   Pulse: 84 72 77   Patient Position - Orthostatic VS: Sitting Sitting Sitting         Visual Acuity      ED Medications  Medications   albuterol (PROVENTIL HFA,VENTOLIN HFA) inhaler 2 puff (2 puffs Inhalation Given 5/23/21 1528)   albuterol inhalation solution 5 mg (5 mg Nebulization Given 5/23/21 1527)   ipratropium (ATROVENT) 0 02 % inhalation solution 0 5 mg (0 5 mg Nebulization Given 5/23/21 1527)   predniSONE tablet 60 mg (60 mg Oral Given 5/23/21 1527)       Diagnostic Studies  Results Reviewed     None                 XR chest 2 views   ED Interpretation by Ximena Spivey DO (05/23 1634)   No acute cardiopulmonary abnormalities  Procedures  Procedures         ED Course  ED Course as of May 23 1641   Sun May 23, 2021   1616 Patient feels much improved after breathing treatment  SBIRT 22yo+      Most Recent Value   SBIRT (22 yo +)   In order to provide better care to our patients, we are screening all of our patients for alcohol and drug use  Would it be okay to ask you these screening questions?   Yes Filed at: 05/23/2021 1509   Initial Alcohol Screen: US AUDIT-C    1  How often do you have a drink containing alcohol?  0 Filed at: 05/23/2021 1509   2  How many drinks containing alcohol do you have on a typical day you are drinking? 0 Filed at: 05/23/2021 1509   3b  FEMALE Any Age, or MALE 65+: How often do you have 4 or more drinks on one occassion? 0 Filed at: 05/23/2021 1509   Audit-C Score  0 Filed at: 05/23/2021 1509   YUVAL: How many times in the past year have you    Used an illegal drug or used a prescription medication for non-medical reasons? Never Filed at: 05/23/2021 1509                    MDM  Number of Diagnoses or Management Options  Asthma exacerbation:   Dyspnea:   Diagnosis management comments: Asthma exacerbation  Patient feels improved after breathing treatment provided here  Chest x-ray is normal   Patient is discharged home with albuterol inhaler and steroids  Amount and/or Complexity of Data Reviewed  Tests in the radiology section of CPT®: ordered and reviewed        Disposition  Final diagnoses:   Dyspnea   Asthma exacerbation     Time reflects when diagnosis was documented in both MDM as applicable and the Disposition within this note     Time User Action Codes Description Comment    5/23/2021  4:34 PM Dhaval Ragland Add [R06 00] Dyspnea     5/23/2021  4:34 PM Jayden Dorman Add [U32 691] Asthma exacerbation       ED Disposition     ED Disposition Condition Date/Time Comment    Discharge Stable Sun May 23, 2021  4:34 PM Talia Hionjosa discharge to home/self care              Follow-up Information     Follow up With Specialties Details Why Contact Info Additional [address] [facility] Emergency Department Emergency Medicine  If symptoms worsen: difficulty breathing, fever, chills, etc [address] [place] [id number] [facility] Emergency Department, [address] [place], [place], [address] your PCP for follow up         [employer]   Call if you do not have a primary care provider  [phone]             Patient's Medications   Discharge Prescriptions    ALBUTEROL (PROVENTIL HFA,VENTOLIN HFA) 90 MCG/ACT INHALER    Inhale 2 puffs every 4 (four) hours as needed for wheezing or shortness of breath       Start Date: 5/23/2021 End Date: --       Order Dose: 2 puffs       Quantity: 8 g    Refills: 0    METHYLPREDNISOLONE 4 MG TABLET THERAPY PACK    Use as directed on package       Start Date: 5/23/2021 End Date: --       Order Dose: --       Quantity: 21 tablet    Refills: 0     No discharge procedures on file      PDMP Review     None          ED Provider  Electronically Signed by           Gasper Brice DO  05/23/21 8123

## 2021-05-23 NOTE — DISCHARGE INSTRUCTIONS
Normal chest xray  Does not appear consistent with infection  Take the course of steroids  Use the albuterol inhaler as needed for shortness of breath

## 2021-05-25 LAB
ATRIAL RATE: 87 BPM
P AXIS: 26 DEGREES
PR INTERVAL: 146 MS
QRS AXIS: 55 DEGREES
QRSD INTERVAL: 86 MS
QT INTERVAL: 350 MS
QTC INTERVAL: 421 MS
T WAVE AXIS: 55 DEGREES
VENTRICULAR RATE: 87 BPM

## 2021-05-25 PROCEDURE — 93010 ELECTROCARDIOGRAM REPORT: CPT | Performed by: INTERNAL MEDICINE

## 2021-06-23 DIAGNOSIS — M70.62 TROCHANTERIC BURSITIS OF LEFT HIP: ICD-10-CM

## 2021-06-23 RX ORDER — MELOXICAM 15 MG/1
15 TABLET ORAL ONCE AS NEEDED
Qty: 30 TABLET | Refills: 1 | Status: SHIPPED | OUTPATIENT
Start: 2021-06-23 | End: 2021-08-09 | Stop reason: SDUPTHER

## 2021-08-09 DIAGNOSIS — M70.62 TROCHANTERIC BURSITIS OF LEFT HIP: ICD-10-CM

## 2021-08-10 ENCOUNTER — HOSPITAL ENCOUNTER (OUTPATIENT)
Dept: ULTRASOUND IMAGING | Facility: CLINIC | Age: 36
Discharge: HOME | End: 2021-08-10
Payer: COMMERCIAL

## 2021-08-10 ENCOUNTER — APPOINTMENT (OUTPATIENT)
Dept: RADIOLOGY | Facility: CLINIC | Age: 36
End: 2021-08-10
Payer: COMMERCIAL

## 2021-08-10 DIAGNOSIS — N94.6 DYSMENORRHEA: ICD-10-CM

## 2021-08-10 DIAGNOSIS — R52 PAIN: ICD-10-CM

## 2021-08-10 DIAGNOSIS — T14.8XXA FRACTURE: ICD-10-CM

## 2021-08-10 PROCEDURE — 76856 US EXAM PELVIC COMPLETE: CPT

## 2021-08-10 PROCEDURE — 72070 X-RAY EXAM THORAC SPINE 2VWS: CPT

## 2021-08-10 PROCEDURE — 73562 X-RAY EXAM OF KNEE 3: CPT

## 2021-08-10 PROCEDURE — 73610 X-RAY EXAM OF ANKLE: CPT

## 2021-08-10 PROCEDURE — 76830 TRANSVAGINAL US NON-OB: CPT

## 2021-08-10 PROCEDURE — 73130 X-RAY EXAM OF HAND: CPT

## 2021-08-10 PROCEDURE — 73502 X-RAY EXAM HIP UNI 2-3 VIEWS: CPT

## 2021-08-10 RX ORDER — MELOXICAM 15 MG/1
15 TABLET ORAL ONCE AS NEEDED
Qty: 30 TABLET | Refills: 1 | Status: SHIPPED | OUTPATIENT
Start: 2021-08-10

## 2021-08-10 RX ORDER — LOSARTAN POTASSIUM 50 MG/1
50 TABLET ORAL DAILY
Qty: 30 TABLET | Refills: 3 | Status: SHIPPED | OUTPATIENT
Start: 2021-08-10

## 2021-08-12 ENCOUNTER — APPOINTMENT (OUTPATIENT)
Dept: LAB | Facility: CLINIC | Age: 36
End: 2021-08-12
Payer: COMMERCIAL

## 2021-08-12 DIAGNOSIS — N83.202 BILATERAL OVARIAN CYSTS: ICD-10-CM

## 2021-08-12 DIAGNOSIS — M79.10 MYALGIA: ICD-10-CM

## 2021-08-12 DIAGNOSIS — M25.561 RIGHT KNEE PAIN, UNSPECIFIED CHRONICITY: ICD-10-CM

## 2021-08-12 DIAGNOSIS — M25.562 LEFT KNEE PAIN, UNSPECIFIED CHRONICITY: ICD-10-CM

## 2021-08-12 DIAGNOSIS — N83.201 BILATERAL OVARIAN CYSTS: ICD-10-CM

## 2021-08-12 DIAGNOSIS — M25.59 PAIN IN OTHER SPECIFIED JOINT: ICD-10-CM

## 2021-08-12 DIAGNOSIS — E66.01 MORBID OBESITY (HCC): ICD-10-CM

## 2021-08-12 DIAGNOSIS — R73.03 DIABETES MELLITUS, LATENT: ICD-10-CM

## 2021-08-12 DIAGNOSIS — G45.9 INTERMITTENT CEREBRAL ISCHEMIA: ICD-10-CM

## 2021-08-12 DIAGNOSIS — M25.552 LEFT HIP PAIN: ICD-10-CM

## 2021-08-12 DIAGNOSIS — K62.89 CHRONIC IDIOPATHIC ANAL PAIN: ICD-10-CM

## 2021-08-12 DIAGNOSIS — E78.5 HYPERLIPIDEMIA, UNSPECIFIED HYPERLIPIDEMIA TYPE: ICD-10-CM

## 2021-08-12 DIAGNOSIS — M25.551 RIGHT HIP PAIN: ICD-10-CM

## 2021-08-12 DIAGNOSIS — G89.29 CHRONIC IDIOPATHIC ANAL PAIN: ICD-10-CM

## 2021-08-12 LAB
25(OH)D3 SERPL-MCNC: 22.7 NG/ML (ref 30–100)
ALBUMIN SERPL BCP-MCNC: 3.8 G/DL (ref 3.5–5)
ALP SERPL-CCNC: 62 U/L (ref 46–116)
ALT SERPL W P-5'-P-CCNC: 38 U/L (ref 12–78)
ANION GAP SERPL CALCULATED.3IONS-SCNC: 6 MMOL/L (ref 4–13)
AST SERPL W P-5'-P-CCNC: 18 U/L (ref 5–45)
BILIRUB SERPL-MCNC: 0.79 MG/DL (ref 0.2–1)
BUN SERPL-MCNC: 12 MG/DL (ref 5–25)
CALCIUM SERPL-MCNC: 9.7 MG/DL (ref 8.3–10.1)
CHLORIDE SERPL-SCNC: 104 MMOL/L (ref 100–108)
CHOLEST SERPL-MCNC: 190 MG/DL (ref 50–200)
CK SERPL-CCNC: 87 U/L (ref 26–192)
CO2 SERPL-SCNC: 27 MMOL/L (ref 21–32)
CREAT SERPL-MCNC: 0.72 MG/DL (ref 0.6–1.3)
CRP SERPL QL: 17.2 MG/L
ERYTHROCYTE [SEDIMENTATION RATE] IN BLOOD: 34 MM/HOUR (ref 0–19)
EST. AVERAGE GLUCOSE BLD GHB EST-MCNC: 123 MG/DL
FSH SERPL-ACNC: 6.2 MIU/ML
GFR SERPL CREATININE-BSD FRML MDRD: 107 ML/MIN/1.73SQ M
GLUCOSE SERPL-MCNC: 77 MG/DL (ref 65–140)
HBA1C MFR BLD: 5.9 %
HDLC SERPL-MCNC: 54 MG/DL
INSULIN SERPL-ACNC: 25.6 MU/L (ref 3–25)
LDLC SERPL CALC-MCNC: 114 MG/DL (ref 0–100)
LH SERPL-ACNC: 18.2 MIU/ML
MAGNESIUM SERPL-MCNC: 2.2 MG/DL (ref 1.6–2.6)
NONHDLC SERPL-MCNC: 136 MG/DL
POTASSIUM SERPL-SCNC: 3.9 MMOL/L (ref 3.5–5.3)
PROLACTIN SERPL-MCNC: 12.3 NG/ML
PROT SERPL-MCNC: 8.5 G/DL (ref 6.4–8.2)
SODIUM SERPL-SCNC: 137 MMOL/L (ref 136–145)
T4 FREE SERPL-MCNC: 0.96 NG/DL (ref 0.76–1.46)
TRIGL SERPL-MCNC: 112 MG/DL
TSH SERPL DL<=0.05 MIU/L-ACNC: 2.29 UIU/ML (ref 0.36–3.74)

## 2021-08-12 PROCEDURE — 86200 CCP ANTIBODY: CPT

## 2021-08-12 PROCEDURE — 84403 ASSAY OF TOTAL TESTOSTERONE: CPT

## 2021-08-12 PROCEDURE — 85652 RBC SED RATE AUTOMATED: CPT

## 2021-08-12 PROCEDURE — 80061 LIPID PANEL: CPT

## 2021-08-12 PROCEDURE — 83036 HEMOGLOBIN GLYCOSYLATED A1C: CPT

## 2021-08-12 PROCEDURE — 84443 ASSAY THYROID STIM HORMONE: CPT

## 2021-08-12 PROCEDURE — 86140 C-REACTIVE PROTEIN: CPT

## 2021-08-12 PROCEDURE — 84146 ASSAY OF PROLACTIN: CPT

## 2021-08-12 PROCEDURE — 36415 COLL VENOUS BLD VENIPUNCTURE: CPT

## 2021-08-12 PROCEDURE — 86800 THYROGLOBULIN ANTIBODY: CPT

## 2021-08-12 PROCEDURE — 82627 DEHYDROEPIANDROSTERONE: CPT

## 2021-08-12 PROCEDURE — 86430 RHEUMATOID FACTOR TEST QUAL: CPT

## 2021-08-12 PROCEDURE — 80053 COMPREHEN METABOLIC PANEL: CPT

## 2021-08-12 PROCEDURE — 83735 ASSAY OF MAGNESIUM: CPT

## 2021-08-12 PROCEDURE — 86038 ANTINUCLEAR ANTIBODIES: CPT

## 2021-08-12 PROCEDURE — 83002 ASSAY OF GONADOTROPIN (LH): CPT

## 2021-08-12 PROCEDURE — 84439 ASSAY OF FREE THYROXINE: CPT

## 2021-08-12 PROCEDURE — 83001 ASSAY OF GONADOTROPIN (FSH): CPT

## 2021-08-12 PROCEDURE — 86376 MICROSOMAL ANTIBODY EACH: CPT

## 2021-08-12 PROCEDURE — 84402 ASSAY OF FREE TESTOSTERONE: CPT

## 2021-08-12 PROCEDURE — 83498 ASY HYDROXYPROGESTERONE 17-D: CPT

## 2021-08-12 PROCEDURE — 82550 ASSAY OF CK (CPK): CPT

## 2021-08-12 PROCEDURE — 83525 ASSAY OF INSULIN: CPT

## 2021-08-12 PROCEDURE — 82306 VITAMIN D 25 HYDROXY: CPT

## 2021-08-13 LAB
CCP AB SER IA-ACNC: 1.2
DHEA-S SERPL-MCNC: 228 UG/DL (ref 57.3–279.2)
RHEUMATOID FACT SER QL LA: NEGATIVE
RYE IGE QN: NEGATIVE
TESTOST FREE SERPL-MCNC: 2.8 PG/ML (ref 0–4.2)
TESTOST SERPL-MCNC: 41 NG/DL (ref 8–60)
THYROGLOB AB SERPL-ACNC: <1 IU/ML (ref 0–0.9)
THYROPEROXIDASE AB SERPL-ACNC: 20 IU/ML (ref 0–34)

## 2021-08-19 LAB — 17OHP SERPL-MCNC: 147 NG/DL

## 2021-08-25 ENCOUNTER — TELEPHONE (OUTPATIENT)
Dept: GASTROENTEROLOGY | Facility: CLINIC | Age: 36
End: 2021-08-25

## 2021-09-15 ENCOUNTER — TELEPHONE (OUTPATIENT)
Dept: NEUROLOGY | Facility: CLINIC | Age: 36
End: 2021-09-15

## 2021-09-15 NOTE — TELEPHONE ENCOUNTER
ADD-ON: 9/17/21 @ 3:45PM Pauline Rooney IN [facility]  INSURANCE IS CURRENT

## 2021-10-26 ENCOUNTER — TELEPHONE (OUTPATIENT)
Dept: NEUROLOGY | Facility: CLINIC | Age: 36
End: 2021-10-26

## 2021-10-29 ENCOUNTER — OFFICE VISIT (OUTPATIENT)
Dept: NEUROLOGY | Facility: CLINIC | Age: 36
End: 2021-10-29
Payer: COMMERCIAL

## 2021-10-29 VITALS
HEIGHT: 68 IN | WEIGHT: 293 LBS | TEMPERATURE: 97.4 F | SYSTOLIC BLOOD PRESSURE: 110 MMHG | BODY MASS INDEX: 44.41 KG/M2 | HEART RATE: 88 BPM | DIASTOLIC BLOOD PRESSURE: 80 MMHG

## 2021-10-29 DIAGNOSIS — G57.12 MERALGIA PARAESTHETICA, LEFT: ICD-10-CM

## 2021-10-29 DIAGNOSIS — G43.711 INTRACTABLE CHRONIC MIGRAINE WITHOUT AURA AND WITH STATUS MIGRAINOSUS: Primary | ICD-10-CM

## 2021-10-29 PROCEDURE — 99214 OFFICE O/P EST MOD 30 MIN: CPT | Performed by: PSYCHIATRY & NEUROLOGY

## 2021-10-29 RX ORDER — ZOLMITRIPTAN 5 MG/1
5 TABLET, FILM COATED ORAL ONCE AS NEEDED
Qty: 9 TABLET | Refills: 4 | Status: SHIPPED | OUTPATIENT
Start: 2021-10-29

## 2021-10-29 RX ORDER — TOPIRAMATE 25 MG/1
25 TABLET ORAL DAILY
Qty: 30 TABLET | Refills: 6 | Status: SHIPPED | OUTPATIENT
Start: 2021-10-29

## 2021-10-29 RX ORDER — TOPIRAMATE 25 MG/1
TABLET ORAL
COMMUNITY
End: 2021-10-29 | Stop reason: SDUPTHER

## 2022-09-08 ENCOUNTER — TELEPHONE (OUTPATIENT)
Dept: NEUROLOGY | Facility: CLINIC | Age: 37
End: 2022-09-08

## 2022-09-08 NOTE — TELEPHONE ENCOUNTER
[id number] and left a message to reschedule 9/15/22 w/Carmita Moore  Advised patient to callback a [phone]  Please transfer this call to either Barbara Bruno or Tressa Pate for rescheduling

## 2022-09-12 ENCOUNTER — TELEPHONE (OUTPATIENT)
Dept: NEUROLOGY | Facility: CLINIC | Age: 37
End: 2022-09-12

## 2022-09-14 NOTE — TELEPHONE ENCOUNTER
3rd and final attempt was made regarding pts appt on 09/15 notified patient she must now call back and reschedule and the appt for 09/15 will now be cancelled

## 2022-09-15 ENCOUNTER — TELEPHONE (OUTPATIENT)
Dept: NEUROLOGY | Facility: CLINIC | Age: 37
End: 2022-09-15

## 2022-09-15 NOTE — TELEPHONE ENCOUNTER
Left message to confirm appt for 9/20/22 with [facility] in [facility]

## 2022-09-20 ENCOUNTER — OFFICE VISIT (OUTPATIENT)
Dept: NEUROLOGY | Facility: CLINIC | Age: 37
End: 2022-09-20

## 2022-09-20 VITALS
SYSTOLIC BLOOD PRESSURE: 128 MMHG | OXYGEN SATURATION: 98 % | BODY MASS INDEX: 30.31 KG/M2 | DIASTOLIC BLOOD PRESSURE: 60 MMHG | RESPIRATION RATE: 19 BRPM | WEIGHT: 200 LBS | TEMPERATURE: 97.9 F | HEART RATE: 92 BPM | HEIGHT: 68 IN

## 2022-09-20 DIAGNOSIS — G43.711 INTRACTABLE CHRONIC MIGRAINE WITHOUT AURA AND WITH STATUS MIGRAINOSUS: ICD-10-CM

## 2022-09-20 PROCEDURE — 96372 THER/PROPH/DIAG INJ SC/IM: CPT

## 2022-09-20 PROCEDURE — 99215 OFFICE O/P EST HI 40 MIN: CPT

## 2022-09-20 RX ORDER — PROCHLORPERAZINE MALEATE 10 MG
TABLET ORAL
Qty: 10 TABLET | Refills: 2 | Status: SHIPPED | OUTPATIENT
Start: 2022-09-20

## 2022-09-20 RX ORDER — GABAPENTIN 300 MG/1
300 CAPSULE ORAL DAILY
Qty: 90 CAPSULE | Refills: 1 | Status: SHIPPED | OUTPATIENT
Start: 2022-09-20

## 2022-09-20 RX ORDER — TOPIRAMATE 25 MG/1
TABLET ORAL
Qty: 120 TABLET | Refills: 0 | Status: SHIPPED | OUTPATIENT
Start: 2022-09-20

## 2022-09-20 RX ORDER — KETOROLAC TROMETHAMINE 30 MG/ML
60 INJECTION, SOLUTION INTRAMUSCULAR; INTRAVENOUS ONCE
Status: COMPLETED | OUTPATIENT
Start: 2022-09-20 | End: 2022-09-20

## 2022-09-20 RX ORDER — ZOLMITRIPTAN 5 MG/1
TABLET, FILM COATED ORAL
Qty: 9 TABLET | Refills: 2 | Status: SHIPPED | OUTPATIENT
Start: 2022-09-20

## 2022-09-20 RX ORDER — PROCHLORPERAZINE EDISYLATE 5 MG/ML
10 INJECTION INTRAMUSCULAR; INTRAVENOUS ONCE
Status: COMPLETED | OUTPATIENT
Start: 2022-09-20 | End: 2022-09-20

## 2022-09-20 RX ADMIN — PROCHLORPERAZINE EDISYLATE 10 MG: 5 INJECTION INTRAMUSCULAR; INTRAVENOUS at 15:00

## 2022-09-20 RX ADMIN — KETOROLAC TROMETHAMINE 60 MG: 30 INJECTION, SOLUTION INTRAMUSCULAR; INTRAVENOUS at 15:04

## 2022-09-20 NOTE — PATIENT INSTRUCTIONS
Headache/migraine treatment:   - When you have a moderate to severe headache, you should seek rest, initiate relaxation and apply cold compresses to the head  Abortive medications (for immediate treatment of a headache): It is ok to take ibuprofen, acetaminophen or naproxen (Advil, Tylenol,  Aleve, Excedrin) if they help your headaches you should limit these to no more than 3 times a week to avoid medication overuse/rebound headaches  Take Zomig 5 mg +/- Ibuprofen 400 mg +/- Compazine 10 mg at the onset of a migraine headache  Over the counter preventive supplements for headaches/migraines   (to take every day to help prevent headaches - not to take at the time of headache):  [x] Magnesium 500mg daily (If any diarrhea or upset stomach, decrease dose  as tolerated)  [x] Riboflavin (Vitamin B2) 400mg daily (FYI B2 may make your urine bright/neon yellow)       Prescription preventive medications for headaches/migraines   (to take every day to help prevent headaches - not to take at the time of headache):    Restart Topamax 25 mg at bedtime for 1 week, then increase by 25 mg every 7 days until at the lowest effective dose or maximum dose of 50 mg twice daily     Begin taking Gabapentin 300 mg at bedtime nightly (not as needed)      *Typically these types of medications take time untill you see the benefit, although some may see improvement in days, often it may take weeks, especially if the medication is being titrated up to a beneficial level  Please contact us if there are any concerns or questions regarding the medication  Self-Monitoring:  [x] Headache calendar  Each day khalif a number from 0-10 indicating if there was a headache and how bad it was  This can be used to monitor gradual improvement and is helpful to make medication adjustments  You can do this on paper or there is an JAI for a smart phone called "Migraine e Diary"        Lifestyle Recommendations:  [x] SLEEP - Maintain a regular sleep schedule: Adults need at least 7-8 hours of uninterrupted a night  Maintain good sleep hygiene:  Going to bed and waking up at consistent times, avoiding excessive daytime naps, avoiding caffeinated beverages in the evening, avoid excessive stimulation in the evening and generally using bed primarily for sleeping  One hour before bedtime would recommend turning lights down lower, decreasing your activity (may read quietly, listen to music at a low volume)  When you get into bed, should eliminate all technology (no texting, emailing, playing with your phone, iPad or tablet in bed)  [x] HYDRATION - Maintain good hydration  Drink  2L of fluid a day (4 typical small water bottles)  [x] DIET - Maintain good nutrition  In particular don't skip meals and try and eat healthy balanced meals regularly  [x] TRIGGERS - Look for other triggers and avoid them: Limit caffeine to 1-2 cups a day or less  Avoid dietary triggers that you have noticed bring on your headaches (this could include aged cheese, peanuts, MSG, aspartame and nitrates)  [x] EXERCISE - physical exercise as we all know is good for you in many ways, and not only is good for your heart, but also is beneficial for your mental health, cognitive health and  chronic pain/headaches  I would encourage at the least 5 days of physical exercise weekly for at least 30 minutes  Education and Follow-up  [x] Please call with any questions or concerns  Of course if any new concerning symptoms go to the emergency department  [x] Follow up in 3 months, sooner if needed  [x] Will consider MRI Brain in the future, when health insurance is active again  [x] Reintroduce screens gradually; do not limit your exposure to lights, people or screens unless having an acute migraine  [x] Limit Benadryl use to only 25-50 mg as needed; not daily

## 2022-09-20 NOTE — ASSESSMENT & PLAN NOTE
Prasad Fry is a 43year old female who presents to the office to follow up on her migraine headaches which have been intolerable over the last 1 5 years  Due to her loss of insurance she has been off of all of her medications including topiramate and is only using gabapentin and zomig as needed  She tolerated topiramate in the past but it was ineffective at 25 mg and was never increased further  We did discuss several options today including reinitiating topiramate and gradually increasing the dose, increasing gabapentin and using it on a scheduled basis instead of as needed, SSRI/SNRI/TCA, alternatives also included anti CGRP medications including Emgality, Aimovig, Ajovy, or Botox  After thorough discussion, and keeping in mind that the patient is currently without health insurance and is paying for treatment OOP, we decided to reinitiate topiramate with a taper schedule up to a maximum of 50 mg twice a day, or the lowest effective dose  She will also start to take gabapentin 300 mg at bedtime nightly  As an abortive Zomig has been partially effective, therefore I will ask her to combine this with ibuprofen and Compazine for maximal effect at the onset of a migraine headache  I have also asked her to restart magnesium and riboflavin  She was also given Toradol 60 mg and Compazine 10 mg IM today for her acute migraine  Plan discussed with patient as outlined below  Plan:  Headache/migraine treatment:   - When you have a moderate to severe headache, you should seek rest, initiate relaxation and apply cold compresses to the head  Abortive medications (for immediate treatment of a headache): It is ok to take ibuprofen, acetaminophen or naproxen (Advil, Tylenol,  Aleve, Excedrin) if they help your headaches you should limit these to no more than 3 times a week to avoid medication overuse/rebound headaches       Take Zomig 5 mg +/- Ibuprofen 400 mg +/- Compazine 10 mg at the onset of a migraine headache  Over the counter preventive supplements for headaches/migraines   (to take every day to help prevent headaches - not to take at the time of headache):  [x] Magnesium 500mg daily (If any diarrhea or upset stomach, decrease dose  as tolerated)  [x] Riboflavin (Vitamin B2) 400mg daily (FYI B2 may make your urine bright/neon yellow)       Prescription preventive medications for headaches/migraines   (to take every day to help prevent headaches - not to take at the time of headache):    Restart Topamax 25 mg at bedtime for 1 week, then increase by 25 mg every 7 days until at the lowest effective dose or maximum dose of 50 mg twice daily     Begin taking Gabapentin 300 mg at bedtime nightly (not as needed)    *Typically these types of medications take time untill you see the benefit, although some may see improvement in days, often it may take weeks, especially if the medication is being titrated up to a beneficial level  Please contact us if there are any concerns or questions regarding the medication  Self-Monitoring:  [x] Headache calendar  Each day khalif a number from 0-10 indicating if there was a headache and how bad it was  This can be used to monitor gradual improvement and is helpful to make medication adjustments  You can do this on paper or there is an JAI for a smart phone called "Migraine e Diary"  Lifestyle Recommendations:  [x] SLEEP - Maintain a regular sleep schedule: Adults need at least 7-8 hours of uninterrupted a night  Maintain good sleep hygiene:  Going to bed and waking up at consistent times, avoiding excessive daytime naps, avoiding caffeinated beverages in the evening, avoid excessive stimulation in the evening and generally using bed primarily for sleeping  One hour before bedtime would recommend turning lights down lower, decreasing your activity (may read quietly, listen to music at a low volume)   When you get into bed, should eliminate all technology (no texting, emailing, playing with your phone, iPad or tablet in bed)  [x] HYDRATION - Maintain good hydration  Drink  2L of fluid a day (4 typical small water bottles)  [x] DIET - Maintain good nutrition  In particular don't skip meals and try and eat healthy balanced meals regularly  [x] TRIGGERS - Look for other triggers and avoid them: Limit caffeine to 1-2 cups a day or less  Avoid dietary triggers that you have noticed bring on your headaches (this could include aged cheese, peanuts, MSG, aspartame and nitrates)  [x] EXERCISE - physical exercise as we all know is good for you in many ways, and not only is good for your heart, but also is beneficial for your mental health, cognitive health and  chronic pain/headaches  I would encourage at the least 5 days of physical exercise weekly for at least 30 minutes  Education and Follow-up  [x] Please call with any questions or concerns  Of course if any new concerning symptoms go to the emergency department  [x] Follow up in 3 months, sooner if needed  [x] Will consider MRI Brain in the future, when health insurance is active again  [x] Reintroduce screens gradually; do not limit your exposure to lights, people or screens unless having an acute migraine  [x] Limit Benadryl use to only 25-50 mg as needed; not daily

## 2022-09-20 NOTE — PROGRESS NOTES
Patient ID: Ginger Saucedo is a 43 y o  female  Assessment/Plan:    Migraine   Talialiborio Hinojosa is a 43year old female who presents to the office to follow up on her migraine headaches which have been intolerable over the last 1 5 years  Due to her loss of insurance she has been off of all of her medications including topiramate and is only using gabapentin and zomig as needed  She tolerated topiramate in the past but it was ineffective at 25 mg and was never increased further  We did discuss several options today including reinitiating topiramate and gradually increasing the dose, increasing gabapentin and using it on a scheduled basis instead of as needed, SSRI/SNRI/TCA, alternatives also included anti CGRP medications including Emgality, Aimovig, Ajovy, or Botox  After thorough discussion, and keeping in mind that the patient is currently without health insurance and is paying for treatment OOP, we decided to reinitiate topiramate with a taper schedule up to a maximum of 50 mg twice a day, or the lowest effective dose  She will also start to take gabapentin 300 mg at bedtime nightly  As an abortive Zomig has been partially effective, therefore I will ask her to combine this with ibuprofen and Compazine for maximal effect at the onset of a migraine headache  I have also asked her to restart magnesium and riboflavin  She was also given Toradol 60 mg and Compazine 10 mg IM today for her acute migraine  Plan discussed with patient as outlined below  Plan:  Headache/migraine treatment:   - When you have a moderate to severe headache, you should seek rest, initiate relaxation and apply cold compresses to the head  Abortive medications (for immediate treatment of a headache): It is ok to take ibuprofen, acetaminophen or naproxen (Advil, Tylenol,  Aleve, Excedrin) if they help your headaches you should limit these to no more than 3 times a week to avoid medication overuse/rebound headaches  Take Zomig 5 mg +/- Ibuprofen 400 mg +/- Compazine 10 mg at the onset of a migraine headache  Over the counter preventive supplements for headaches/migraines   (to take every day to help prevent headaches - not to take at the time of headache):  [x] Magnesium 500mg daily (If any diarrhea or upset stomach, decrease dose  as tolerated)  [x] Riboflavin (Vitamin B2) 400mg daily (FYI B2 may make your urine bright/neon yellow)       Prescription preventive medications for headaches/migraines   (to take every day to help prevent headaches - not to take at the time of headache):    Restart Topamax 25 mg at bedtime for 1 week, then increase by 25 mg every 7 days until at the lowest effective dose or maximum dose of 50 mg twice daily     Begin taking Gabapentin 300 mg at bedtime nightly (not as needed)    *Typically these types of medications take time untill you see the benefit, although some may see improvement in days, often it may take weeks, especially if the medication is being titrated up to a beneficial level  Please contact us if there are any concerns or questions regarding the medication  Self-Monitoring:  [x] Headache calendar  Each day khalif a number from 0-10 indicating if there was a headache and how bad it was  This can be used to monitor gradual improvement and is helpful to make medication adjustments  You can do this on paper or there is an [employer] for a smart phone called "Migraine e Diary"  Lifestyle Recommendations:  [x] SLEEP - Maintain a regular sleep schedule: Adults need at least 7-8 hours of uninterrupted a night  Maintain good sleep hygiene:  Going to bed and waking up at consistent times, avoiding excessive daytime naps, avoiding caffeinated beverages in the evening, avoid excessive stimulation in the evening and generally using bed primarily for sleeping    One hour before bedtime would recommend turning lights down lower, decreasing your activity (may read quietly, listen to music at a low volume)  When you get into bed, should eliminate all technology (no texting, emailing, playing with your phone, iPad or tablet in bed)  [x] HYDRATION - Maintain good hydration  Drink  2L of fluid a day (4 typical small water bottles)  [x] DIET - Maintain good nutrition  In particular don't skip meals and try and eat healthy balanced meals regularly  [x] TRIGGERS - Look for other triggers and avoid them: Limit caffeine to 1-2 cups a day or less  Avoid dietary triggers that you have noticed bring on your headaches (this could include aged cheese, peanuts, MSG, aspartame and nitrates)  [x] EXERCISE - physical exercise as we all know is good for you in many ways, and not only is good for your heart, but also is beneficial for your mental health, cognitive health and  chronic pain/headaches  I would encourage at the least 5 days of physical exercise weekly for at least 30 minutes  Education and Follow-up  [x] Please call with any questions or concerns  Of course if any new concerning symptoms go to the emergency department  [x] Follow up in 3 months, sooner if needed  [x] Will consider MRI Brain in the future, when health insurance is active again  [x] Reintroduce screens gradually; do not limit your exposure to lights, people or screens unless having an acute migraine  [x] Limit Benadryl use to only 25-50 mg as needed; not daily  Diagnoses and all orders for this visit:    Intractable chronic migraine without aura and with status migrainosus  -     prochlorperazine (COMPAZINE) 10 mg tablet; Take 1 tablet with Zomig 5 mg +/- Ibuprofen 400 mg at the onset of a migraine headache, no more than 3 times per week or 10 times per month  -     topiramate (TOPAMAX) 25 mg tablet;  Take 1 tablet at bedtime x 1 week, then increase to 1 tab twice daily x 1 week, then continue to increase by 25 mg every 7 days until at the lowest effective dose or maximum dose of 50 mg bid  -     gabapentin (NEURONTIN) 300 mg capsule; Take 1 capsule (300 mg total) by mouth daily  -     ZOLMitriptan (ZOMIG) 5 MG tablet; Take 1 tablet by mouth with Compazine 10 mg +/- ibuprofen at the onset of a migraine headache, no more than 3 times per week or 9 times per month  -     ketorolac (TORADOL) 60 mg/2 mL IM injection 60 mg  -     prochlorperazine (COMPAZINE) injection 10 mg    Other orders  -     TURMERIC PO; Take by mouth       I have spent 50 minutes in face to face time and chart review with this patient today  Subjective:    DENISA Adan is a 43year old female who presents to the office to follow up on her migraine headaches  She was last seen 10/29/21 by Dr Emil Kwok  At that time she was taking Topamax 25 mg, gabapentin 600 mg as needed (for leg pain/numbness) and Zomig as needed at the onset of a migraine  She is known to have a prior hx of concussion in May 2020, with an exacerbation of her migraines since that time although she reports first developing migraine headaches in her early teen years  Today she returns to the office and states she has been in unbearable pain for the last 1 5 years  She denies any new migraine characteristics  She states her migraine headaches are debilitating and due to 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  As a result she has also now lost health insurance and is self pay today  She is not presently taking any medications for her migraine headaches as she states "they never worked anyway"  Currently she reports using Benadryl 25 mg up to qid almost daily, as this is the only thing that helps to some degree  She describes her migraines in more detail below:    Trigger: Every time it rains her headaches are severe, barometric pressure drops  Duration: She reports her migraines are intermittent but last hours-days; last year had a migraine that lasted 60 days straight    Associated symptoms: She has severe nausea and vomiting occurring daily; visual disturbance, blurred vision  Frequency: On average she has a migraine 4-5 days a week, sometimes one migraine that lasts the entire time, other times it comes and goes  Location: right retro-orbital and sometimes left tempo-parietal; always unilateral   Quality: severe pain and tenderness, "like a bruise" or "hit with a hammer" but not pulsating  Last year was on the "verge of suicide" because of how significant her pain was  Denies current SI  She states she has been in "complete darkness" for the last 1 month; limiting her exposure to lights, people and screens  Prior Preventatives  She tolerated topiramate 25 mg without any side effects but states it was ineffective; she states it was never increased past 25 mg  Gabapentin 600 mg only as needed for her leg pain/numbness (hx Meralgia Paresthetica)    Prior Abortives  Sumatriptan gave her wrist and neck pain  She tolerates Zomig better but only takes the edge off - on average goes from 10/10 to 6/10    Prior testing:  MRI Brain 7/10/2018  Minimal encephalomalacia right superior cerebellum likely sequela of chronic infarct  No acute intracranial abnormality  MRI Brain 9/22/2017  No acute infarction, intracranial hemorrhage or mass effect    CTA H/N 9/21/2017  No acute intracranial abnormality  No signs of intracranial vascular occlusive disease or aneurysm formation  No hemodynamically significant stenosis within the cervical carotids by NASCET criteria  Patent bilateral cervical vertebral arteries  The following portions of the patient's history were reviewed and updated as appropriate: allergies, current medications, past family history, past medical history, past social history and past surgical history  Objective:    Blood pressure 128/60, pulse 92, temperature 97 9 °F (36 6 °C), temperature source Tympanic, resp  rate 19, height 5' 8" (1 727 m), weight 90 7 kg (200 lb), SpO2 98 %      Neurological Exam    On neurological examination patient is alert, awake, oriented and in no distress  Speech is fluent without dysarthria or aphasia  Cranial nerves 2-12 were symmetrically intact bilaterally  Fundoscopic exam was attempted but was not tolerated by patient with acute migraine with light sensitivity during exam  No temporal artery tenderness  No evidence of any focal weakness or sensory loss in the upper or lower extremities  Motor testing reveals 5/5 strength of the bilateral upper and lower extremities  There was no pronator drift  No fasciculations present  No abnormal involuntary movements  Finger- to-nose reveals no tremor or ataxia and intact proprioceptive function, no dysmetria was noted  Sensation was intact to vibration, light touch, pin prick and temperature in bilateral upper and lower extremities  Deep tendon reflexes were 1+ and symmetric in the bilateral upper and lower extremities  She is able to rise easily without assistance from a seated position  Casual gait is normal including stance, stride, and arm swing  Normal tandem gait  Romberg is absent  ROS:    Review of Systems   Constitutional: Positive for fatigue  Negative for appetite change and fever  HENT: Negative  Negative for hearing loss, tinnitus, trouble swallowing and voice change  Eyes: Negative  Negative for photophobia and pain  Respiratory: Positive for shortness of breath  Cardiovascular: Positive for palpitations  Gastrointestinal: Positive for nausea and vomiting  Endocrine: Negative  Negative for cold intolerance  Genitourinary: Negative  Negative for dysuria, frequency and urgency  Musculoskeletal: Positive for back pain (low back pain) and neck pain  Negative for myalgias  Spinal fusion 2015   Skin: Negative  Negative for rash  Allergic/Immunologic: Negative  Neurological: Positive for dizziness (at times) and headaches (migraine's 8/2022 everyday-pain level 6 today get's worse)   Negative for tremors, seizures, syncope, facial asymmetry, speech difficulty, weakness, light-headedness (at times) and numbness  Hematological: Negative  Does not bruise/bleed easily  Psychiatric/Behavioral: Positive for sleep disturbance  Negative for confusion and hallucinations  The patient is nervous/anxious  Depression, anxiety and mood swings      Reviewed ROS as entered by medical assistant